# Patient Record
Sex: FEMALE | Race: WHITE | Employment: UNEMPLOYED | URBAN - METROPOLITAN AREA
[De-identification: names, ages, dates, MRNs, and addresses within clinical notes are randomized per-mention and may not be internally consistent; named-entity substitution may affect disease eponyms.]

---

## 2018-10-05 ENCOUNTER — HOSPITAL ENCOUNTER (EMERGENCY)
Age: 63
Discharge: HOME | End: 2018-10-06
Attending: EMERGENCY MEDICINE
Payer: SELF-PAY

## 2018-10-05 ENCOUNTER — APPOINTMENT (OUTPATIENT)
Dept: GENERAL RADIOLOGY | Age: 63
End: 2018-10-05
Attending: NURSE PRACTITIONER
Payer: SELF-PAY

## 2018-10-05 DIAGNOSIS — L97.909 DIABETIC ULCER OF LOWER LEG (HCC): ICD-10-CM

## 2018-10-05 DIAGNOSIS — E11.622 DIABETIC ULCER OF LOWER LEG (HCC): ICD-10-CM

## 2018-10-05 DIAGNOSIS — L03.115 CELLULITIS OF RIGHT LOWER EXTREMITY: Primary | ICD-10-CM

## 2018-10-05 DIAGNOSIS — R03.0 ELEVATED BLOOD PRESSURE READING: ICD-10-CM

## 2018-10-05 LAB
ALBUMIN SERPL-MCNC: 4 G/DL (ref 3.4–5)
ALBUMIN/GLOB SERPL: 0.8 {RATIO} (ref 0.8–1.7)
ALP SERPL-CCNC: 115 U/L (ref 45–117)
ALT SERPL-CCNC: 19 U/L (ref 13–56)
ANION GAP SERPL CALC-SCNC: 10 MMOL/L (ref 3–18)
AST SERPL-CCNC: 12 U/L (ref 15–37)
BASOPHILS # BLD: 0 K/UL (ref 0–0.1)
BASOPHILS NFR BLD: 0 % (ref 0–2)
BILIRUB SERPL-MCNC: 0.4 MG/DL (ref 0.2–1)
BUN SERPL-MCNC: 18 MG/DL (ref 7–18)
BUN/CREAT SERPL: 15 (ref 12–20)
CALCIUM SERPL-MCNC: 9.7 MG/DL (ref 8.5–10.1)
CHLORIDE SERPL-SCNC: 99 MMOL/L (ref 100–108)
CK MB CFR SERPL CALC: NORMAL % (ref 0–4)
CK MB SERPL-MCNC: <1 NG/ML (ref 5–25)
CK SERPL-CCNC: 68 U/L (ref 26–192)
CO2 SERPL-SCNC: 30 MMOL/L (ref 21–32)
CREAT SERPL-MCNC: 1.22 MG/DL (ref 0.6–1.3)
DIFFERENTIAL METHOD BLD: ABNORMAL
EOSINOPHIL # BLD: 0.2 K/UL (ref 0–0.4)
EOSINOPHIL NFR BLD: 2 % (ref 0–5)
ERYTHROCYTE [DISTWIDTH] IN BLOOD BY AUTOMATED COUNT: 13 % (ref 11.6–14.5)
GLOBULIN SER CALC-MCNC: 4.9 G/DL (ref 2–4)
GLUCOSE SERPL-MCNC: 126 MG/DL (ref 74–99)
HCT VFR BLD AUTO: 40.8 % (ref 35–45)
HGB BLD-MCNC: 13.2 G/DL (ref 12–16)
LYMPHOCYTES # BLD: 1.4 K/UL (ref 0.9–3.6)
LYMPHOCYTES NFR BLD: 12 % (ref 21–52)
MCH RBC QN AUTO: 29.1 PG (ref 24–34)
MCHC RBC AUTO-ENTMCNC: 32.4 G/DL (ref 31–37)
MCV RBC AUTO: 90.1 FL (ref 74–97)
MONOCYTES # BLD: 0.7 K/UL (ref 0.05–1.2)
MONOCYTES NFR BLD: 6 % (ref 3–10)
NEUTS SEG # BLD: 9.8 K/UL (ref 1.8–8)
NEUTS SEG NFR BLD: 80 % (ref 40–73)
PLATELET # BLD AUTO: 357 K/UL (ref 135–420)
PMV BLD AUTO: 9.6 FL (ref 9.2–11.8)
POTASSIUM SERPL-SCNC: 2.9 MMOL/L (ref 3.5–5.5)
PROT SERPL-MCNC: 8.9 G/DL (ref 6.4–8.2)
RBC # BLD AUTO: 4.53 M/UL (ref 4.2–5.3)
SODIUM SERPL-SCNC: 139 MMOL/L (ref 136–145)
TROPONIN I SERPL-MCNC: <0.02 NG/ML (ref 0–0.04)
WBC # BLD AUTO: 12.2 K/UL (ref 4.6–13.2)

## 2018-10-05 PROCEDURE — 99285 EMERGENCY DEPT VISIT HI MDM: CPT

## 2018-10-05 PROCEDURE — 74011250637 HC RX REV CODE- 250/637: Performed by: EMERGENCY MEDICINE

## 2018-10-05 PROCEDURE — 71045 X-RAY EXAM CHEST 1 VIEW: CPT

## 2018-10-05 PROCEDURE — 80053 COMPREHEN METABOLIC PANEL: CPT | Performed by: NURSE PRACTITIONER

## 2018-10-05 PROCEDURE — 82550 ASSAY OF CK (CPK): CPT | Performed by: NURSE PRACTITIONER

## 2018-10-05 PROCEDURE — 85025 COMPLETE CBC W/AUTO DIFF WBC: CPT | Performed by: NURSE PRACTITIONER

## 2018-10-05 PROCEDURE — 93005 ELECTROCARDIOGRAM TRACING: CPT

## 2018-10-05 RX ORDER — BACITRACIN 500 UNIT/G
PACKET (EA) TOPICAL
Status: DISCONTINUED
Start: 2018-10-05 | End: 2018-10-06 | Stop reason: HOSPADM

## 2018-10-05 RX ORDER — OXYCODONE AND ACETAMINOPHEN 5; 325 MG/1; MG/1
1 TABLET ORAL
Qty: 10 TAB | Refills: 0 | Status: SHIPPED | OUTPATIENT
Start: 2018-10-05 | End: 2018-12-11 | Stop reason: ALTCHOICE

## 2018-10-05 RX ORDER — AMLODIPINE BESYLATE 5 MG/1
10 TABLET ORAL
Status: COMPLETED | OUTPATIENT
Start: 2018-10-05 | End: 2018-10-05

## 2018-10-05 RX ORDER — DOXYCYCLINE 100 MG/1
100 CAPSULE ORAL 2 TIMES DAILY
Qty: 20 CAP | Refills: 0 | Status: SHIPPED | OUTPATIENT
Start: 2018-10-05 | End: 2018-10-22 | Stop reason: ALTCHOICE

## 2018-10-05 RX ORDER — HYDROCHLOROTHIAZIDE 25 MG/1
50 TABLET ORAL DAILY
Qty: 20 TAB | Refills: 0 | Status: SHIPPED | OUTPATIENT
Start: 2018-10-05 | End: 2018-10-15

## 2018-10-05 RX ORDER — POTASSIUM CHLORIDE 20 MEQ/1
40 TABLET, EXTENDED RELEASE ORAL
Status: COMPLETED | OUTPATIENT
Start: 2018-10-05 | End: 2018-10-05

## 2018-10-05 RX ORDER — ONDANSETRON 4 MG/1
4 TABLET, ORALLY DISINTEGRATING ORAL
Qty: 15 TAB | Refills: 0 | Status: SHIPPED | OUTPATIENT
Start: 2018-10-05 | End: 2019-02-22

## 2018-10-05 RX ORDER — BACITRACIN 500 [USP'U]/G
OINTMENT TOPICAL 3 TIMES DAILY
Qty: 1 TUBE | Refills: 0 | Status: SHIPPED | OUTPATIENT
Start: 2018-10-05 | End: 2018-10-15

## 2018-10-05 RX ORDER — OXYCODONE AND ACETAMINOPHEN 5; 325 MG/1; MG/1
1 TABLET ORAL
Status: COMPLETED | OUTPATIENT
Start: 2018-10-05 | End: 2018-10-05

## 2018-10-05 RX ORDER — HYDROCHLOROTHIAZIDE 25 MG/1
50 TABLET ORAL
Status: COMPLETED | OUTPATIENT
Start: 2018-10-05 | End: 2018-10-05

## 2018-10-05 RX ORDER — ONDANSETRON 4 MG/1
4 TABLET, ORALLY DISINTEGRATING ORAL
Status: COMPLETED | OUTPATIENT
Start: 2018-10-05 | End: 2018-10-05

## 2018-10-05 RX ORDER — AMLODIPINE BESYLATE 10 MG/1
10 TABLET ORAL DAILY
Qty: 10 TAB | Refills: 0 | Status: SHIPPED | OUTPATIENT
Start: 2018-10-05 | End: 2018-10-11 | Stop reason: ALTCHOICE

## 2018-10-05 RX ADMIN — AMLODIPINE BESYLATE 10 MG: 5 TABLET ORAL at 21:01

## 2018-10-05 RX ADMIN — OXYCODONE AND ACETAMINOPHEN 1 TABLET: 5; 325 TABLET ORAL at 22:57

## 2018-10-05 RX ADMIN — ONDANSETRON 4 MG: 4 TABLET, ORALLY DISINTEGRATING ORAL at 22:57

## 2018-10-05 RX ADMIN — POTASSIUM CHLORIDE 40 MEQ: 20 TABLET, EXTENDED RELEASE ORAL at 21:02

## 2018-10-05 RX ADMIN — HYDROCHLOROTHIAZIDE 50 MG: 25 TABLET ORAL at 21:42

## 2018-10-05 NOTE — ED TRIAGE NOTES
Has been months without meds and doctors care. [facility] , right lower leg cellulitis , draining wound. Dressed at [facility].  Pt sent here for pain and high BP.

## 2018-10-06 VITALS
SYSTOLIC BLOOD PRESSURE: 178 MMHG | RESPIRATION RATE: 18 BRPM | OXYGEN SATURATION: 96 % | DIASTOLIC BLOOD PRESSURE: 110 MMHG | TEMPERATURE: 97.5 F | HEART RATE: 87 BPM | HEIGHT: 66 IN | WEIGHT: 260 LBS | BODY MASS INDEX: 41.78 KG/M2

## 2018-10-06 NOTE — DISCHARGE INSTRUCTIONS
Cellulitis: Care Instructions  Your Care Instructions    Cellulitis is a skin infection caused by bacteria, most often strep or staph. It often occurs after a break in the skin from a scrape, cut, bite, or puncture, or after a rash. Cellulitis may be treated without doing tests to find out what caused it. But your doctor may do tests, if needed, to look for a specific bacteria, like methicillin-resistant Staphylococcus aureus (MRSA). The doctor has checked you carefully, but problems can develop later. If you notice any problems or new symptoms, get medical treatment right away. Follow-up care is a key part of your treatment and safety. Be sure to make and go to all appointments, and call your doctor if you are having problems. It's also a good idea to know your test results and keep a list of the medicines you take. How can you care for yourself at home? · Take your antibiotics as directed. Do not stop taking them just because you feel better. You need to take the full course of antibiotics. · Prop up the infected area on pillows to reduce pain and swelling. Try to keep the area above the level of your heart as often as you can. · If your doctor told you how to care for your wound, follow your doctor's instructions. If you did not get instructions, follow this general advice:  ¨ Wash the wound with clean water 2 times a day. Don't use hydrogen peroxide or alcohol, which can slow healing. ¨ You may cover the wound with a thin layer of petroleum jelly, such as Vaseline, and a nonstick bandage. ¨ Apply more petroleum jelly and replace the bandage as needed. · Be safe with medicines. Take pain medicines exactly as directed. ¨ If the doctor gave you a prescription medicine for pain, take it as prescribed. ¨ If you are not taking a prescription pain medicine, ask your doctor if you can take an over-the-counter medicine.   To prevent cellulitis in the future  · Try to prevent cuts, scrapes, or other injuries to your skin. Cellulitis most often occurs where there is a break in the skin. · If you get a scrape, cut, mild burn, or bite, wash the wound with clean water as soon as you can to help avoid infection. Don't use hydrogen peroxide or alcohol, which can slow healing. · If you have swelling in your legs (edema), support stockings and good skin care may help prevent leg sores and cellulitis. · Take care of your feet, especially if you have diabetes or other conditions that increase the risk of infection. Wear shoes and socks. Do not go barefoot. If you have athlete's foot or other skin problems on your feet, talk to your doctor about how to treat them. When should you call for help? Call your doctor now or seek immediate medical care if:    · You have signs that your infection is getting worse, such as:  ¨ Increased pain, swelling, warmth, or redness. ¨ Red streaks leading from the area. ¨ Pus draining from the area. ¨ A fever.     · You get a rash.    Watch closely for changes in your health, and be sure to contact your doctor if:    · You do not get better as expected. Where can you learn more? Go to http://nata-fatmata.info/. Randi Augustin in the search box to learn more about \"Cellulitis: Care Instructions. \"  Current as of: April 18, 2018  Content Version: 11.8  © 2157-6171 [employer]wise, [employer]. Care instructions adapted under license by [employer] (which disclaims liability or warranty for this information). If you have questions about a medical condition or this instruction, always ask your healthcare professional. [place] [address] any warranty or liability for your use of this information.

## 2018-10-06 NOTE — ED NOTES
Purposeful rounding completed: 
 
Side rails up x 1:  YES Bed in low position and wheels locked: YES Call bell within reach: YES Comfort addressed: YES Toileting needs addressed: YES Plan of care reviewed/updated with patient and or family members: YES 
IV site assessed: YES Pain assessed and addressed: YES, 8

## 2018-10-06 NOTE — ED PROVIDER NOTES
EMERGENCY DEPARTMENT HISTORY AND PHYSICAL EXAM 
 
8:47 PM 
 
 
Date: 10/5/2018 Patient Name: Omar Pickering History of Presenting Illness Chief Complaint Patient presents with  
 Skin Infection  Hypertension History Provided By: Patient and patients  Chief Complaint: Cellulitus Duration:  Months Timing:  Worsening Location: bilateral legs and feet Quality: not obtained Severity: Moderate Modifying Factors: N/A Associated Symptoms: N/A Additional History (Context): 8:48 PM Omar Pickering is a 58 y.o. female with h/o diet controlled diabetes and HTN who presents to ED complaining of moderate bilateral cellulitus of the legs and feet onset 1.5 months ago that increased in pain severity today after stubbing toes. Patient is not managing HTN or diabetes with medication as she is no longer seeing a podiatrist despite chronic cellulitus. Complaints are not assocaited with fever, CP, or dyspnea. Patient takes 1 asprin per day. No other concerns or symptoms at this time. bp elevated, noncompliant with meds 2/2 insurance/financial difficulty. PCP: Ha Allen MD 
 
 
 
Current Facility-Administered Medications Medication Dose Route Frequency Provider Last Rate Last Dose  bacitracin 500 unit/gram packet Current Outpatient Prescriptions Medication Sig Dispense Refill  oxyCODONE-acetaminophen (PERCOCET) 5-325 mg per tablet Take 1 Tab by mouth every four (4) hours as needed for Pain. Max Daily Amount: 6 Tabs. 10 Tab 0  
 ondansetron (ZOFRAN ODT) 4 mg disintegrating tablet Take 1 Tab by mouth every eight (8) hours as needed for Nausea. 15 Tab 0  
 amLODIPine (NORVASC) 10 mg tablet Take 1 Tab by mouth daily for 10 days. 10 Tab 0  
 doxycycline (MONODOX) 100 mg capsule Take 1 Cap by mouth two (2) times a day. 20 Cap 0  
 hydroCHLOROthiazide (HYDRODIURIL) 25 mg tablet Take 2 Tabs by mouth daily for 10 days.  20 Tab 0  
  bacitracin (BACITRACIN) 500 unit/gram oint Apply  to affected area three (3) times daily for 10 days. Apply to affected area 1 Tube 0 Past History Past Medical History: 
Past Medical History:  
Diagnosis Date  Anemia  Arthritis   
 both knee  Diabetes ([address] Diet controlled  HLD (hyperlipidemia)  Hypertension  [facility]AD [facility]per[facility] artery disease) ([facility])   
 s/p L TP trunk athrectomy (05/2013)  Stroke ([facility] [address] 2009 Past Surgical History: 
Past Surgical History:  
Procedure Laterality Date  HX KNEE REPLACEMENT    
 left  HX TUMOR REMOVAL    
 soft palate Family History: 
Family History Problem Relation Age of Onset  Cancer Mother  Cancer Father  Diabetes Father  Hypertension Father Social History: 
Social History Substance Use Topics  Smoking status: Never Smoker  Smokeless tobacco: Never Used  Alcohol use No  
 
 
Allergies: Allergies Allergen Reactions  Codeine Hives  Lisinopril Swelling All statins  Sulfur Hives Review of Systems Review of Systems Constitutional: Negative for chills and fever. Respiratory: Negative for shortness of breath. Cardiovascular: Negative for chest pain. Skin:  
     cellulitus of bilateral legs and feet All other systems reviewed and are negative. Visit Vitals  BP (!) 178/110  Pulse 87  Temp 97.5 °F (36.4 °C)  Resp 18  Ht 5' 6\" (1.676 m)  Wt 117.9 kg (260 lb)  SpO2 96%  BMI 41.97 kg/m2 Physical Exam / Medical Decision Making I am the first provider for this patient. I reviewed the vital signs, available nursing notes, past medical history, past surgical history, family history and social history. Vital Signs-Reviewed the patient's vital signs. Physical exam: 
General:  obese, no apparent distress. Head:  Normocephalic atraumatic. Eyes:  Pupils midrange extraocular movements intact.   No pallor or conjunctival injection. Nose:  No rhinorrhea, inspection grossly normal.   
Ears:  Grossly normal to inspection, no discharge. Mouth:  Mucous membranes moist, no appreciable intraoral lesion. Neck/Back:  Trachea midline, no asymmetry. Chest:  Grossly normal inspection, symmetric chest rise. Pulmonary:  Clear to auscultation bilaterally no wheezes rhonchi or rales. Cardiovascular:  S1-S2 no murmurs rubs or gallops. Abdomen: Soft, nontender, nondistended no guarding rebound or peritoneal signs. Extremities:  Grossly normal to inspection, peripheral pulses intact  1+ pitting edema symmetric bilaterally chronic skin changes, overlying the RIGHT mid shin there is mild erythema and mild warmth and a large patch of shallow skin ulcer with fat layer exposed. Neurologic:  Alert and oriented no appreciable focal neurologic deficit Skin:  Warm and dry Psychiatric:  Grossly normal mood and affect Nursing note reviewed, vital signs reviewed. ED course: 
Patient presents with cellulitis secondary to a diabetic ulcer, noncompliance with antihypertensive medication, she is afebrile and not tachycardic hypertensive here saturation normal on room air, last was sent prior to my evaluation White blood cell count not elevated, chemistry unremarkable, She had a ACE inhibitor cough reported, did not want to take an ACE inhibitor, will start Norvasc, antibiotic and if blood pressure controlled she is clinically stable for a trial of antibiotic as an outpatient. She'll require podiatry follow-up EKG done at 1813 hrs. normal sinus rhythm heart rate 99 intervals within normal limits no ST changes no ectopy. Blood pressure persistently elevated, she was given a pressure medication pain medication her blood pressure rechecked in the 170s, labs are largely unremarkable she was referred to PCP started on antibiotics, blood pressure medication, pain medication with strict return precautions Patient's presentation, history, physical exam and laboratory evaluations were reviewed. At this time patient was felt to be stable for outpatient management and follow with primary care/specialist.  Patient was instructed to return to the emergency department with any concerns. Disposition: 
 
Discharged home Portions of this chart were created with Dragon medical speech to text program.   Unrecognized errors may be present. Diagnostic Study Results Labs - Recent Results (from the past 12 hour(s)) EKG, 12 LEAD, INITIAL Collection Time: 10/05/18  6:13 PM  
Result Value Ref Range Ventricular Rate 99 BPM  
 Atrial Rate 99 BPM  
 P-R Interval 152 ms QRS Duration 106 ms  
 Q-T Interval 376 ms QTC Calculation (Bezet) 482 ms Calculated P Axis 3 degrees Calculated R Axis 41 degrees Calculated T Axis 37 degrees Diagnosis Normal sinus rhythm Nonspecific ST abnormality Abnormal ECG When compared with ECG of 26-JAN-2010 11:26, 
Vent. rate has increased BY  43 BPM 
ST now depressed in Lateral leads CBC WITH AUTOMATED DIFF Collection Time: 10/05/18  6:20 PM  
Result Value Ref Range WBC 12.2 4.6 - 13.2 K/uL  
 RBC 4.53 4.20 - 5.30 M/uL  
 HGB 13.2 12.0 - 16.0 g/dL HCT 40.8 35.0 - 45.0 % MCV 90.1 74.0 - 97.0 FL  
 MCH 29.1 24.0 - 34.0 PG  
 MCHC 32.4 31.0 - 37.0 g/dL  
 RDW 13.0 11.6 - 14.5 % PLATELET 254 401 - 095 K/uL MPV 9.6 9.2 - 11.8 FL  
 NEUTROPHILS 80 (H) 40 - 73 % LYMPHOCYTES 12 (L) 21 - 52 % MONOCYTES 6 3 - 10 % EOSINOPHILS 2 0 - 5 % BASOPHILS 0 0 - 2 %  
 ABS. NEUTROPHILS 9.8 (H) 1.8 - 8.0 K/UL  
 ABS. LYMPHOCYTES 1.4 0.9 - 3.6 K/UL  
 ABS. MONOCYTES 0.7 0.05 - 1.2 K/UL  
 ABS. EOSINOPHILS 0.2 0.0 - 0.4 K/UL  
 ABS. BASOPHILS 0.0 0.0 - 0.1 K/UL  
 DF AUTOMATED METABOLIC PANEL, COMPREHENSIVE Collection Time: 10/05/18  6:20 PM  
Result Value Ref Range Sodium 139 136 - 145 mmol/L  Potassium 2.9 (LL) 3.5 - 5.5 mmol/L  
 Chloride 99 (L) 100 - 108 mmol/L  
 CO2 30 21 - 32 mmol/L Anion gap 10 3.0 - 18 mmol/L Glucose 126 (H) 74 - 99 mg/dL BUN 18 7.0 - 18 MG/DL Creatinine 1.22 0.6 - 1.3 MG/DL  
 BUN/Creatinine ratio 15 12 - 20 GFR est AA 54 (L) >60 ml/min/1.73m2 GFR est non-AA 45 (L) >60 ml/min/1.73m2 Calcium 9.7 8.5 - 10.1 MG/DL Bilirubin, total 0.4 0.2 - 1.0 MG/DL  
 ALT (SGPT) 19 13 - 56 U/L  
 AST (SGOT) 12 (L) 15 - 37 U/L Alk. phosphatase 115 45 - 117 U/L Protein, total 8.9 (H) 6.4 - 8.2 g/dL Albumin 4.0 3.4 - 5.0 g/dL Globulin 4.9 (H) 2.0 - 4.0 g/dL A-G Ratio 0.8 0.8 - 1.7 CARDIAC PANEL,(CK, CKMB & TROPONIN) Collection Time: 10/05/18  6:20 PM  
Result Value Ref Range CK 68 26 - 192 U/L  
 CK - MB <1.0 <3.6 ng/ml CK-MB Index  0.0 - 4.0 % CALCULATION NOT PERFORMED WHEN RESULT IS BELOW LINEAR LIMIT Troponin-I, Qt. <0.02 0.0 - 0.045 NG/ML Radiologic Studies -  
XR CHEST PORT    (Results Pending) Diagnosis Clinical Impression: 1. Cellulitis of right lower extremity 2. Diabetic ulcer of lower leg ([address] 3. Elevated blood pressure reading Follow-up Information Follow up With Details Comments Contact Info Lashae Hinds MD Call in 1 day  [address] [facility] [address] [place] [id number] [phone] [facility] EMERGENCY DEPT  As needed, If symptoms worsen [address] 
[phone] Discharge Medication List as of 10/5/2018 10:37 PM  
  
START taking these medications Details  
oxyCODONE-acetaminophen (PERCOCET) 5-325 mg per tablet Take 1 Tab by mouth every four (4) hours as needed for Pain. Max Daily Amount: 6 Tabs., Print, Disp-10 Tab, R-0  
  
ondansetron (ZOFRAN ODT) 4 mg disintegrating tablet Take 1 Tab by mouth every eight (8) hours as needed for Nausea., Normal, Disp-15 Tab, R-0  
  
amLODIPine (NORVASC) 10 mg tablet Take 1 Tab by mouth daily for 10 days. , Normal, Disp-10 Tab, R-0  
  
 doxycycline (MONODOX) 100 mg capsule Take 1 Cap by mouth two (2) times a day., Normal, Disp-20 Cap, R-0  
  
hydroCHLOROthiazide (HYDRODIURIL) 25 mg tablet Take 2 Tabs by mouth daily for 10 days. , Normal, Disp-20 Tab, R-0  
  
  
 
_______________________________ Attestations: Scribe Attestation [employer] acting as a scribe for and in the presence of Angel Hairston MD     
October 05, 2018 at 8:58 PM 
    
Provider Attestation:     
I personally performed the services described in the documentation, reviewed the documentation, as recorded by the scribe in my presence, and it accurately and completely records my words and actions. October 05, 2018 at 8:58 PM - Angel Hairston MD   
 
_______________________________

## 2018-10-07 LAB
ATRIAL RATE: 99 BPM
CALCULATED P AXIS, ECG09: 3 DEGREES
CALCULATED R AXIS, ECG10: 41 DEGREES
CALCULATED T AXIS, ECG11: 37 DEGREES
DIAGNOSIS, 93000: NORMAL
P-R INTERVAL, ECG05: 152 MS
Q-T INTERVAL, ECG07: 376 MS
QRS DURATION, ECG06: 106 MS
QTC CALCULATION (BEZET), ECG08: 482 MS
VENTRICULAR RATE, ECG03: 99 BPM

## 2018-10-11 ENCOUNTER — OFFICE VISIT (OUTPATIENT)
Dept: FAMILY MEDICINE CLINIC | Age: 63
End: 2018-10-11

## 2018-10-11 VITALS
SYSTOLIC BLOOD PRESSURE: 145 MMHG | WEIGHT: 265.8 LBS | HEART RATE: 87 BPM | RESPIRATION RATE: 18 BRPM | TEMPERATURE: 96.8 F | HEIGHT: 66 IN | DIASTOLIC BLOOD PRESSURE: 68 MMHG | BODY MASS INDEX: 42.72 KG/M2 | OXYGEN SATURATION: 95 %

## 2018-10-11 DIAGNOSIS — I10 ESSENTIAL HYPERTENSION: ICD-10-CM

## 2018-10-11 DIAGNOSIS — Z86.73 H/O: STROKE: ICD-10-CM

## 2018-10-11 DIAGNOSIS — M79.89 LEG SWELLING: Primary | ICD-10-CM

## 2018-10-11 DIAGNOSIS — I73.9 PAD (PERIPHERAL ARTERY DISEASE) (HCC): ICD-10-CM

## 2018-10-11 DIAGNOSIS — M79.604 PAIN OF RIGHT LOWER EXTREMITY: ICD-10-CM

## 2018-10-11 DIAGNOSIS — L97.912 ULCER OF LOWER EXTREMITY WITH FAT LAYER EXPOSED, RIGHT (HCC): ICD-10-CM

## 2018-10-11 PROBLEM — E66.01 OBESITY, MORBID (HCC): Status: ACTIVE | Noted: 2018-10-11

## 2018-10-11 PROBLEM — E11.21 TYPE 2 DIABETES WITH NEPHROPATHY (HCC): Status: ACTIVE | Noted: 2018-10-11

## 2018-10-11 RX ORDER — IBUPROFEN 800 MG/1
800 TABLET ORAL
Qty: 30 TAB | Refills: 1 | Status: SHIPPED | OUTPATIENT
Start: 2018-10-11 | End: 2018-12-20 | Stop reason: SDUPTHER

## 2018-10-11 RX ORDER — CIPROFLOXACIN 500 MG/1
500 TABLET ORAL 2 TIMES DAILY
Qty: 20 TAB | Refills: 0 | Status: SHIPPED | OUTPATIENT
Start: 2018-10-11 | End: 2018-10-21

## 2018-10-11 RX ORDER — FUROSEMIDE 40 MG/1
40 TABLET ORAL DAILY
Qty: 30 TAB | Refills: 3 | Status: SHIPPED | OUTPATIENT
Start: 2018-10-11 | End: 2018-11-05 | Stop reason: SDUPTHER

## 2018-10-11 RX ORDER — CEFTRIAXONE 1 G/1
1 INJECTION, POWDER, FOR SOLUTION INTRAMUSCULAR; INTRAVENOUS ONCE
Qty: 1 VIAL | Refills: 0
Start: 2018-10-11 | End: 2018-10-11

## 2018-10-11 RX ORDER — ASPIRIN 81 MG/1
81 TABLET ORAL DAILY
COMMUNITY

## 2018-10-11 RX ORDER — LOSARTAN POTASSIUM 50 MG/1
50 TABLET ORAL DAILY
Qty: 30 TAB | Refills: 3 | Status: SHIPPED | OUTPATIENT
Start: 2018-10-11 | End: 2019-02-22

## 2018-10-11 RX ORDER — METRONIDAZOLE 500 MG/1
500 TABLET ORAL 3 TIMES DAILY
Qty: 30 TAB | Refills: 0 | Status: SHIPPED | OUTPATIENT
Start: 2018-10-11 | End: 2018-11-05

## 2018-10-11 NOTE — PATIENT INSTRUCTIONS
- take 1/2 tablet of amlodipine in the morning 
- take the other 1/2 tablet of amlodipine in the evening

## 2018-10-11 NOTE — PROGRESS NOTES
PCP discussed with NN instructions for patient to take 1/2 tablet of amlodipine 10mg in the AM and the other half of amlodipine in the evening. Verbal order readback.

## 2018-10-11 NOTE — MR AVS SNAPSHOT
[address] [place] [id number] 
[phone] Patient: Ever Bhakta MRN: [id number] :1955 Visit Information Date & Time Provider Department Dept. Phone Encounter #  
 10/11/2018 12:45 PM [id number], [address] [facility] [phone] Follow-up Instructions Return in about 1 week (around 10/18/2018). Upcoming Health Maintenance Date Due  
 FOOT EXAM Q1 1965 MICROALBUMIN Q1 1965 EYE EXAM RETINAL OR DILATED Q1 1965 DTaP/Tdap/Td series (1 - Tdap) 1976 PAP AKA CERVICAL CYTOLOGY 1976 Shingrix Vaccine Age 50> (1 of 2) 2005 FOBT Q 1 YEAR AGE 50-75 2005 HEMOGLOBIN A1C Q6M 10/2/2012 BREAST CANCER SCRN MAMMOGRAM 2014 LIPID PANEL Q1 2014 Influenza Age 5 to Adult 2018 Allergies as of 10/11/2018  Review Complete On: 10/11/2018 By: Rolly Demarco LPN Severity Noted Reaction Type Reactions Codeine  2013    Hives Lisinopril  10/05/2018    Swelling All statins Sulfur  2013    Hives Current Immunizations  Reviewed on 2014 Name Date Influenza Vaccine 2014  8:30 AM  
 Pneumococcal Vaccine (Unspecified Type) 10/1/2012 Not reviewed this visit You Were Diagnosed With   
  
 Codes Comments Leg swelling    -  Primary ICD-10-CM: M79.89 ICD-9-CM: 729.81 PAD (peripheral artery disease) (HCC)     ICD-10-CM: I73.9 ICD-9-CM: 443.9 Essential hypertension     ICD-10-CM: I10 
ICD-9-CM: 401.9 H/O: stroke     ICD-10-CM: Z86.73 
ICD-9-CM: V12.54 Ulcer of lower extremity with fat layer exposed, right ([address]     ICD-10-CM: U06.614 ICD-9-CM: 707.10 Pain of right lower extremity     ICD-10-CM: M79.604 ICD-9-CM: 729.5 Vitals BP Pulse Temp Resp Height(growth percentile) Weight(growth percentile) 145/68 (BP 1 Location: Left arm, BP Patient Position: Sitting) 87 96.8 °F (36 °C) (Oral) 18 5' 6\" (1.676 m) 265 lb 12.8 oz (120.6 kg) SpO2 BMI OB Status Smoking Status 95% 42.9 kg/m2 Postmenopausal Never Smoker Vitals History BMI and BSA Data Body Mass Index Body Surface Area 42.9 kg/m 2 2.37 m 2 Preferred Pharmacy Pharmacy Name Phone [facility] [phone] Your Updated Medication List  
  
   
This list is accurate as of 10/11/18  1:49 PM.  Always use your most recent med list.  
  
  
  
  
 aspirin delayed-release 81 mg tablet Take  by mouth daily. bacitracin 500 unit/gram Oint Commonly known as:  BACITRACIN Apply  to affected area three (3) times daily for 10 days. Apply to affected area  
  
 cefTRIAXone 1 gram injection Commonly known as:  ROCEPHIN  
1 g by IntraMUSCular route once for 1 dose. ciprofloxacin HCl 500 mg tablet Commonly known as:  CIPRO Take 1 Tab by mouth two (2) times a day for 10 days. doxycycline 100 mg capsule Commonly known as:  Alvera Spells Take 1 Cap by mouth two (2) times a day. furosemide 40 mg tablet Commonly known as:  LASIX Take 1 Tab by mouth daily. hydroCHLOROthiazide 25 mg tablet Commonly known as:  HYDRODIURIL Take 2 Tabs by mouth daily for 10 days. ibuprofen 800 mg tablet Commonly known as:  MOTRIN Take 1 Tab by mouth every eight (8) hours as needed for Pain. metroNIDAZOLE 500 mg tablet Commonly known as:  FLAGYL Take 1 Tab by mouth three (3) times daily. ondansetron 4 mg disintegrating tablet Commonly known as:  ZOFRAN ODT Take 1 Tab by mouth every eight (8) hours as needed for Nausea. oxyCODONE-acetaminophen 5-325 mg per tablet Commonly known as:  PERCOCET Take 1 Tab by mouth every four (4) hours as needed for Pain. Max Daily Amount: 6 Tabs. Prescriptions Sent to Pharmacy Refills  
 furosemide (LASIX) 40 mg tablet 3 Sig: Take 1 Tab by mouth daily. Class: Normal  
 Pharmacy: [address] Ph #: [phone] Route: Oral  
 ciprofloxacin HCl (CIPRO) 500 mg tablet 0 Sig: Take 1 Tab by mouth two (2) times a day for 10 days. Class: Normal  
 Pharmacy: [address] Ph #: [phone] Route: Oral  
 metroNIDAZOLE (FLAGYL) 500 mg tablet 0 Sig: Take 1 Tab by mouth three (3) times daily. Class: Normal  
 Pharmacy: [address] Ph #: [phone] Route: Oral  
 ibuprofen (MOTRIN) 800 mg tablet 1 Sig: Take 1 Tab by mouth every eight (8) hours as needed for Pain. Class: Normal  
 Pharmacy: [address] Ph #: [phone] Route: Oral  
  
We Performed the Following CEFTRIAXONE SODIUM INJECTION  MG [ [facility]] Comments:  
 Mix per protocol TN THER/PROPH/DIAG INJECTION, SUBCUT/IM [id number] CPT(R)] REFERRAL TO CARDIOLOGY [PQQ45 Custom] Follow-up Instructions Return in about 1 week (around 10/18/2018). To-Do List   
 10/11/2018 Lab:  C REACTIVE PROTEIN, QT   
  
 10/11/2018 Microbiology:  CULTURE, WOUND Rahel Thomas STAIN   
  
 10/11/2018 Vascular/US:  DUPLEX LOWER EXT VENOUS BILAT   
  
 10/11/2018 Lab:  METABOLIC PANEL, BASIC   
  
 10/11/2018 Lab:  MICROALBUMIN, UR, RAND W/ MICROALB/CREAT RATIO   
  
 10/11/2018 Lab:  SED RATE (ESR)   
  
 10/11/2018 Lab:  TSH 3RD GENERATION Referral Information Referral ID Referred By Referred To  
  
 [id number] [facility], [address] [facility], [place]   
   [address] [facility] Phone: [phone] Fax: [phone] Visits Status Start Date End Date 1 New Request 10/11/18 10/11/19 If your referral has a status of pending review or denied, additional information will be sent to support the outcome of this decision. Patient Instructions - take 1/2 tablet of amlodipine in the morning 
- take the other 1/2 tablet of amlodipine in the evening Introducing hospitals & HEALTH SERVICES! [facility] introduces [employer] patient portal. Now you can access parts of your medical record, email your doctor's office, and request medication refills online. 1. In your internet browser, go to [email or web address] 2. Click on the First Time User? Click Here link in the Sign In box. You will see the New Member Sign Up page. 3. Enter your [employer] Access Code exactly as it appears below. You will not need to use this code after youve completed the sign-up process. If you do not sign up before the expiration date, you must request a new code. · [employer] Access Code: [email or web address] Expires: 1/3/2019  5:29 PM 
 
4. Enter the last four digits of your Social Security Number (xxxx) and Date of Birth (mm/dd/yyyy) as indicated and click Submit. You will be taken to the next sign-up page. 5. Create a [employer] ID. This will be your [employer] login ID and cannot be changed, so think of one that is secure and easy to remember. 6. Create a [employer] password. You can change your password at any time. 7. Enter your Password Reset Question and Answer. This can be used at a later time if you forget your password. 8. Enter your e-mail address. You will receive e-mail notification when new information is available in [address] 9. Click Sign Up. You can now view and download portions of your medical record. 10. Click the Download Summary menu link to download a portable copy of your medical information.  
 
If you have questions, please visit the Frequently Asked Questions section of the [employer]. Remember, [employer] is NOT to be used for urgent needs. For medical emergencies, dial 911. Now available from your iPhone and Android! Please provide this summary of care documentation to your next provider. Your primary care clinician is listed as Anam Horan. If you have any questions after today's visit, please call [phone].

## 2018-10-11 NOTE — PROGRESS NOTES
Chief Complaint Patient presents with  
 Skin Infection  
  cellulitis RLE  
 Medication Evaluation 1. Have you been to the ER, urgent care clinic since your last visit? Hospitalized since your last visit? Yes When: 10/05/2018 Where: [facility] Reason for visit: Cellulitis 2. Have you seen or consulted any other health care providers outside of the [facility] since your last visit? Include any pap smears or colon screening. No 
 
After obtaining consent, and per orders of Dr. Daly Montez, injection of Ceftriaxone given by Amadou Duncan LPN. Patient instructed to remain in clinic for 20 minutes afterwards, and to report any adverse reaction to me immediately.

## 2018-10-12 LAB
ANION GAP SERPL CALC-SCNC: 26 MMOL/L
BUN SERPL-MCNC: 55 MG/DL (ref 6–22)
C-REACTIVE PROTEIN, QT, 006627: 7.1 MG/DL (ref 0–0.5)
CALCIUM SERPL-MCNC: 9.8 MG/DL (ref 8.4–10.5)
CHLORIDE SERPL-SCNC: 84 MMOL/L (ref 98–110)
CO2 SERPL-SCNC: 24 MMOL/L (ref 20–32)
CREAT SERPL-MCNC: 1.8 MG/DL (ref 0.8–1.4)
GFRAA, 66117: 34.1
GFRNA, 66118: 28.1
GLUCOSE SERPL-MCNC: 228 MG/DL (ref 70–99)
POTASSIUM SERPL-SCNC: 3.1 MMOL/L (ref 3.5–5.5)
RESULT: NORMAL
SED RATE (ESR): 102 MM/HR (ref 0–30)
SODIUM SERPL-SCNC: 134 MMOL/L (ref 133–145)
TSH SERPL DL<=0.005 MIU/L-ACNC: 5.75 MCU/ML (ref 0.27–4.2)

## 2018-10-12 NOTE — PROGRESS NOTES
Veronica Reyes is a 58 y.o.  female and presents with Chief Complaint Patient presents with  
 Skin Infection  
  cellulitis RLE  
 Medication Evaluation  Hypertension  Diabetes  Leg Pain  Leg Swelling Pt was seen about 4 years back. Pt does not have insurance and could not come for follow up. Pt has been having pain , swelling in rt leg for a month. It got worse and she now has open ulcer on back of her rt lower ext. Pt does have h/o PAD. Pt used to see cardiology in the past. 
 
 
Past Medical History:  
Diagnosis Date  Anemia  Arthritis   
 both knee  Diabetes ([facility] [address] Diet controlled  HLD (hyperlipidemia)  Hypertension  PAD (peripheral artery disease) ([facility])   
 s/p L TP trunk athrectomy (05/2013)  Stroke ([facility] [address] 2009 Past Surgical History:  
Procedure Laterality Date  HX KNEE REPLACEMENT    
 left  HX TUMOR REMOVAL    
 soft palate Current Outpatient Prescriptions Medication Sig  
 aspirin delayed-release 81 mg tablet Take  by mouth daily.  furosemide (LASIX) 40 mg tablet Take 1 Tab by mouth daily.  ciprofloxacin HCl (CIPRO) 500 mg tablet Take 1 Tab by mouth two (2) times a day for 10 days.  metroNIDAZOLE (FLAGYL) 500 mg tablet Take 1 Tab by mouth three (3) times daily.  ibuprofen (MOTRIN) 800 mg tablet Take 1 Tab by mouth every eight (8) hours as needed for Pain.  losartan (COZAAR) 50 mg tablet Take 1 Tab by mouth daily.  oxyCODONE-acetaminophen (PERCOCET) 5-325 mg per tablet Take 1 Tab by mouth every four (4) hours as needed for Pain. Max Daily Amount: 6 Tabs.  ondansetron (ZOFRAN ODT) 4 mg disintegrating tablet Take 1 Tab by mouth every eight (8) hours as needed for Nausea.  doxycycline (MONODOX) 100 mg capsule Take 1 Cap by mouth two (2) times a day.  hydroCHLOROthiazide (HYDRODIURIL) 25 mg tablet Take 2 Tabs by mouth daily for 10 days.  bacitracin (BACITRACIN) 500 unit/gram oint Apply  to affected area three (3) times daily for 10 days. Apply to affected area No current facility-administered medications for this visit. Health Maintenance Topic Date Due  
 FOOT EXAM Q1  11/24/1965  MICROALBUMIN Q1  11/24/1965  
 EYE EXAM RETINAL OR DILATED Q1  11/24/1965  DTaP/Tdap/Td series (1 - Tdap) 11/24/1976  PAP AKA CERVICAL CYTOLOGY  11/24/1976  Shingrix Vaccine Age 50> (1 of 2) 11/24/2005  FOBT Q 1 YEAR AGE 50-75  11/24/2005  HEMOGLOBIN A1C Q6M  10/02/2012  BREAST CANCER SCRN MAMMOGRAM  05/30/2014  LIPID PANEL Q1  07/18/2014  Influenza Age 5 to Adult  08/01/2018  Hepatitis C Screening  Completed  Pneumococcal 19-64 Medium Risk  Completed Immunization History Administered Date(s) Administered  Influenza Vaccine 01/23/2014  Pneumococcal Vaccine (Unspecified Type) 10/01/2012 No LMP recorded. Patient is postmenopausal. 
 
 
 
Allergies and Intolerances: Allergies Allergen Reactions  Codeine Hives  Lisinopril Swelling All statins  Sulfur Hives Family History:  
Family History Problem Relation Age of Onset  Cancer Mother  Cancer Father  Diabetes Father  Hypertension Father Social History: She  reports that she has never smoked. She has never used smokeless tobacco.  She  reports that she does not drink alcohol. Review of Systems: pos symptoms as described above General: negative for - chills, fatigue, fever, weight change Psych: negative for - anxiety, depression, irritability or mood swings ENT: negative for - headaches, hearing change, nasal congestion, oral lesions, sneezing or sore throat Heme/ Lymph: negative for - bleeding problems, bruising, pallor or swollen lymph nodes Endo: negative for - hot flashes, polydipsia/polyuria or temperature intolerance Resp: negative for - cough, shortness of breath or wheezing CV: negative for - chest pain, edema or palpitations GI: negative for - abdominal pain, change in bowel habits, constipation, diarrhea or nausea/vomiting : negative for - dysuria, hematuria, incontinence, pelvic pain or vulvar/vaginal symptoms MSK: negative for - joint pain, joint swelling or muscle pain Neuro: negative for - confusion, headaches, seizures or weakness Derm: negative for - dry skin, hair changes, rash or skin lesion changes Physical:  
Vitals:  
Vitals:  
 10/11/18 1255 BP: 145/68 Pulse: 87 Resp: 18 Temp: 96.8 °F (36 °C) TempSrc: Oral  
SpO2: 95% Weight: 265 lb 12.8 oz (120.6 kg) Height: 5' 6\" (1.676 m) Exam:  
HEENT- atraumatic,normocephalic, awake, oriented, well nourished Neck - supple,no enlarged lymph nodes, no JVD, no thyromegaly Chest- CTA, no rhonchi, no crackles Heart- rrr, no murmurs / gallop/rub Abdomen- soft,BS+,NT, no hepatosplenomegaly Ext - swelling ib bothe xt , rt more than left , open ulcer, discharge on posterior aspect of rt lower leg. Neuro- no focal deficits. Power 5/5 all extremities Skin - warm,dry, no obvious rashes. Review of Data:  
LABS:  
Lab Results Component Value Date/Time WBC 12.2 10/05/2018 06:20 PM  
 HGB 13.2 10/05/2018 06:20 PM  
 HCT 40.8 10/05/2018 06:20 PM  
 PLATELET 646 09/53/3579 06:20 PM  
 
Lab Results Component Value Date/Time Sodium 139 10/05/2018 06:20 PM  
 Potassium 2.9 (LL) 10/05/2018 06:20 PM  
 Chloride 99 (L) 10/05/2018 06:20 PM  
 CO2 30 10/05/2018 06:20 PM  
 Glucose 126 (H) 10/05/2018 06:20 PM  
 BUN 18 10/05/2018 06:20 PM  
 Creatinine 1.22 10/05/2018 06:20 PM  
 
Lab Results Component Value Date/Time Cholesterol, total 177 07/18/2013 08:40 AM  
 HDL Cholesterol 71 (H) 07/18/2013 08:40 AM  
 LDL, calculated 94 07/18/2013 08:40 AM  
 Triglyceride 62 07/18/2013 08:40 AM  
 
No results found for: GPT Impression / Plan: ICD-10-CM ICD-9-CM 1. Leg swelling M79.89 729.81 furosemide (LASIX) 40 mg tablet MICROALBUMIN, UR, RAND W/ MICROALB/CREAT RATIO  
   DUPLEX LOWER EXT VENOUS BILAT  
   TSH 3RD GENERATION  
   MICROALBUMIN, UR, RAND W/ MICROALB/CREAT RATIO  
   TSH 3RD GENERATION 2. PAD (peripheral artery disease) (HCC) I73.9 443.9 REFERRAL TO CARDIOLOGY 3. Essential hypertension I03 285.9 METABOLIC PANEL, BASIC  
   losartan (COZAAR) 50 mg tablet METABOLIC PANEL, BASIC  
4. H/O: stroke Z86.73 V12.54   
5. Ulcer of lower extremity with fat layer exposed, right ([address] L97.912 707.10 CULTURE, WOUND W GRAM STAIN  
   cefTRIAXone (ROCEPHIN) 1 gram injection CEFTRIAXONE SODIUM INJECTION  MG  
   MS THER/PROPH/DIAG INJECTION, SUBCUT/IM  
   ciprofloxacin HCl (CIPRO) 500 mg tablet  
   metroNIDAZOLE (FLAGYL) 500 mg tablet SED RATE (ESR) C REACTIVE PROTEIN, QT  
   SED RATE (ESR) C REACTIVE PROTEIN, QT  
   CULTURE, WOUND W GRAM STAIN 6. Pain of right lower extremity M79.604 729.5 ibuprofen (MOTRIN) 800 mg tablet Will need to control leg swelling ,infection and may need intervention for PAD. Explained to patient risk benefits of the medications. Advised patient to stop meds if having any side effects. Pt verbalized understanding of the instructions. I have discussed the diagnosis with the patient and the intended plan as seen in the above orders. The patient has received an after-visit summary and questions were answered concerning future plans. I have discussed medication side effects and warnings with the patient as well. I have reviewed the plan of care with the patient, accepted their input and they are in agreement with the treatment goals. Reviewed plan of care. Patient has provided input and agrees with goals. Follow-up Disposition: 
Return in about 1 week (around 10/18/2018).  
 
Alistair Foster MD

## 2018-10-13 LAB
CREATININE, URINE: 70 MG/DL
MICROALB/CREAT RATIO, 140286: 26.9 MCG/MG OF CREATININE (ref 0–30)
MICROALBUMIN,URINE RANDOM 140054: 18.8 MCG/ML (ref 0.1–17)

## 2018-10-15 LAB — RESULT: ABNORMAL

## 2018-10-22 ENCOUNTER — OFFICE VISIT (OUTPATIENT)
Dept: FAMILY MEDICINE CLINIC | Age: 63
End: 2018-10-22

## 2018-10-22 ENCOUNTER — HOSPITAL ENCOUNTER (OUTPATIENT)
Dept: VASCULAR SURGERY | Age: 63
Discharge: HOME | End: 2018-10-22
Attending: INTERNAL MEDICINE
Payer: SELF-PAY

## 2018-10-22 VITALS
WEIGHT: 264 LBS | TEMPERATURE: 96.4 F | BODY MASS INDEX: 42.43 KG/M2 | HEART RATE: 96 BPM | OXYGEN SATURATION: 93 % | SYSTOLIC BLOOD PRESSURE: 143 MMHG | DIASTOLIC BLOOD PRESSURE: 62 MMHG | RESPIRATION RATE: 18 BRPM | HEIGHT: 66 IN

## 2018-10-22 DIAGNOSIS — I10 ESSENTIAL HYPERTENSION: Primary | ICD-10-CM

## 2018-10-22 DIAGNOSIS — I73.9 PAD (PERIPHERAL ARTERY DISEASE) (HCC): ICD-10-CM

## 2018-10-22 DIAGNOSIS — E03.4 HYPOTHYROIDISM DUE TO ACQUIRED ATROPHY OF THYROID: ICD-10-CM

## 2018-10-22 DIAGNOSIS — M79.89 LEG SWELLING: ICD-10-CM

## 2018-10-22 DIAGNOSIS — T14.8XXA WOUND INFECTION: ICD-10-CM

## 2018-10-22 DIAGNOSIS — L08.9 WOUND INFECTION: ICD-10-CM

## 2018-10-22 DIAGNOSIS — E87.6 HYPOKALEMIA: ICD-10-CM

## 2018-10-22 PROCEDURE — 93970 EXTREMITY STUDY: CPT

## 2018-10-22 RX ORDER — AMLODIPINE BESYLATE 10 MG/1
TABLET ORAL DAILY
COMMUNITY
End: 2018-10-22 | Stop reason: DRUGHIGH

## 2018-10-22 RX ORDER — CEFTRIAXONE 1 G/1
1 INJECTION, POWDER, FOR SOLUTION INTRAMUSCULAR; INTRAVENOUS ONCE
Qty: 1 VIAL | Refills: 0
Start: 2018-10-22 | End: 2018-10-22

## 2018-10-22 RX ORDER — AMLODIPINE BESYLATE 5 MG/1
5 TABLET ORAL DAILY
Qty: 30 TAB | Refills: 3 | Status: SHIPPED | OUTPATIENT
Start: 2018-10-22 | End: 2018-12-11 | Stop reason: ALTCHOICE

## 2018-10-22 RX ORDER — POTASSIUM CHLORIDE 20 MEQ/1
20 TABLET, EXTENDED RELEASE ORAL DAILY
Qty: 30 TAB | Refills: 3 | Status: SHIPPED | OUTPATIENT
Start: 2018-10-22 | End: 2019-02-22

## 2018-10-22 RX ORDER — HYDROCHLOROTHIAZIDE 25 MG/1
25 TABLET ORAL DAILY
COMMUNITY
End: 2018-12-11

## 2018-10-22 RX ORDER — AMITRIPTYLINE HYDROCHLORIDE 10 MG/1
10 TABLET, FILM COATED ORAL
Qty: 30 TAB | Refills: 3 | Status: SHIPPED | OUTPATIENT
Start: 2018-10-22 | End: 2018-11-05

## 2018-10-22 RX ORDER — LEVOTHYROXINE SODIUM 25 UG/1
25 TABLET ORAL
Qty: 30 TAB | Refills: 3 | Status: SHIPPED | OUTPATIENT
Start: 2018-10-22

## 2018-10-22 NOTE — PATIENT INSTRUCTIONS
Take amlodipine 5 mg daily instead of 10 mg daily  Eat bananas  Avoid Motrin, Ibuprofen.   Keep leg elevated

## 2018-10-22 NOTE — PROGRESS NOTES
Mai Alexis is a 58 y.o.  female and presents with     Chief Complaint   Patient presents with    Hypertension    Wound Infection    Leg Swelling    Hypothyroidism       Pt says she feels slightly better since last visit. However she has symptoms of pins and needles in her rt leg. She is taking lasix for leg swelling  She has h/o PAD        Past Medical History:   Diagnosis Date    Anemia     Arthritis     both knee    Diabetes ([facility] [address]     Diet controlled    HLD (hyperlipidemia)     Hypertension     PAD (peripheral artery disease) ([facility])     s/p L TP trunk athrectomy (05/2013)    Stroke ([facility] [address]     2009     Past Surgical History:   Procedure Laterality Date    HX KNEE REPLACEMENT      left    HX TUMOR REMOVAL      soft palate     Current Outpatient Medications   Medication Sig    hydroCHLOROthiazide (HYDRODIURIL) 25 mg tablet Take 25 mg by mouth daily.  potassium chloride (K-DUR, KLOR-CON) 20 mEq tablet Take 1 Tab by mouth daily.  amLODIPine (NORVASC) 5 mg tablet Take 1 Tab by mouth daily.  levothyroxine (SYNTHROID) 25 mcg tablet Take 1 Tab by mouth Daily (before breakfast).  cefTRIAXone (ROCEPHIN) 1 gram injection 1 g by IntraMUSCular route once for 1 dose.  aspirin delayed-release 81 mg tablet Take  by mouth daily.  furosemide (LASIX) 40 mg tablet Take 1 Tab by mouth daily.  metroNIDAZOLE (FLAGYL) 500 mg tablet Take 1 Tab by mouth three (3) times daily.  ibuprofen (MOTRIN) 800 mg tablet Take 1 Tab by mouth every eight (8) hours as needed for Pain.  losartan (COZAAR) 50 mg tablet Take 1 Tab by mouth daily.  oxyCODONE-acetaminophen (PERCOCET) 5-325 mg per tablet Take 1 Tab by mouth every four (4) hours as needed for Pain. Max Daily Amount: 6 Tabs.  ondansetron (ZOFRAN ODT) 4 mg disintegrating tablet Take 1 Tab by mouth every eight (8) hours as needed for Nausea. No current facility-administered medications for this visit.       Health Maintenance   Topic Date Due    FOOT EXAM Q1  11/24/1965    EYE EXAM RETINAL OR DILATED Q1  11/24/1965    DTaP/Tdap/Td series (1 - Tdap) 11/24/1976    PAP AKA CERVICAL CYTOLOGY  11/24/1976    Shingrix Vaccine Age 50> (1 of 2) 11/24/2005    FOBT Q 1 YEAR AGE 50-75  11/24/2005    HEMOGLOBIN A1C Q6M  10/02/2012    BREAST CANCER SCRN MAMMOGRAM  05/30/2014    LIPID PANEL Q1  07/18/2014    Influenza Age 5 to Adult  08/01/2018    MICROALBUMIN Q1  10/12/2019    Hepatitis C Screening  Completed    Pneumococcal 19-64 Medium Risk  Completed     Immunization History   Administered Date(s) Administered    Influenza Vaccine 01/23/2014    Pneumococcal Vaccine (Unspecified Type) 10/01/2012     No LMP recorded. Patient is postmenopausal.        Allergies and Intolerances: Allergies   Allergen Reactions    Codeine Hives    Lisinopril Swelling     All statins    Sulfur Hives       Family History:   Family History   Problem Relation Age of Onset    Cancer Mother     Cancer Father     Diabetes Father     Hypertension Father        Social History:   She  reports that  has never smoked. she has never used smokeless tobacco.  She  reports that she does not drink alcohol.             Review of Systems: pos for rt leg swelling , ulcer  General: negative for - chills, fatigue, fever, weight change  Psych: negative for - anxiety, depression, irritability or mood swings  ENT: negative for - headaches, hearing change, nasal congestion, oral lesions, sneezing or sore throat  Heme/ Lymph: negative for - bleeding problems, bruising, pallor or swollen lymph nodes  Endo: negative for - hot flashes, polydipsia/polyuria or temperature intolerance  Resp: negative for - cough, shortness of breath or wheezing  CV: negative for - chest pain, edema or palpitations  GI: negative for - abdominal pain, change in bowel habits, constipation, diarrhea or nausea/vomiting  : negative for - dysuria, hematuria, incontinence, pelvic pain or vulvar/vaginal symptoms  MSK: negative for - joint pain, joint swelling or muscle pain  Neuro: negative for - confusion, headaches, seizures or weakness  Derm: negative for - dry skin, hair changes, rash or skin lesion changes          Physical:   Vitals:   Vitals:    10/22/18 1252   BP: 143/62   Pulse: 96   Resp: 18   Temp: 96.4 °F (35.8 °C)   TempSrc: Oral   SpO2: 93%   Weight: 264 lb (119.7 kg)   Height: 5' 6\" (1.676 m)           Exam:   HEENT- atraumatic,normocephalic, awake, oriented, well nourished  Neck - supple,no enlarged lymph nodes, no JVD, no thyromegaly  Chest- CTA, no rhonchi, no crackles  Heart- rrr, no murmurs / gallop/rub  Abdomen- soft,BS+,NT, no hepatosplenomegaly  Ext - no c/c/edema , rt leg ulcer on the posterior aspect of lower leg. Non healing ulcer    Neuro- no focal deficits. Power 5/5 all extremities  Skin - warm,dry, no obvious rashes. Review of Data:   LABS:   Lab Results   Component Value Date/Time    WBC 12.2 10/05/2018 06:20 PM    HGB 13.2 10/05/2018 06:20 PM    HCT 40.8 10/05/2018 06:20 PM    PLATELET 873 82/01/3961 06:20 PM     Lab Results   Component Value Date/Time    Sodium 134 10/11/2018 02:00 PM    Potassium 3.1 (L) 10/11/2018 02:00 PM    Chloride 84 (L) 10/11/2018 02:00 PM    CO2 24 10/11/2018 02:00 PM    Glucose 228 (H) 10/11/2018 02:00 PM    BUN 55 (H) 10/11/2018 02:00 PM    Creatinine 1.8 (H) 10/11/2018 02:00 PM     Lab Results   Component Value Date/Time    Cholesterol, total 177 07/18/2013 08:40 AM    HDL Cholesterol 71 (H) 07/18/2013 08:40 AM    LDL, calculated 94 07/18/2013 08:40 AM    Triglyceride 62 07/18/2013 08:40 AM     No results found for: GPT        Impression / Plan:        ICD-10-CM ICD-9-CM    1. Essential hypertension I10 401.9 amLODIPine (NORVASC) 5 mg tablet   2. PAD (peripheral artery disease) (HCC) I73.9 443.9 LOWER EXT ART PVR MULT LEVEL SEG PRESSURES      REFERRAL TO VASCULAR SURGERY   3. Hypokalemia E87.6 276.8    4.  Hypothyroidism due to acquired atrophy of thyroid E03.4 244.8 levothyroxine (SYNTHROID) 25 mcg tablet     246.8    5. Wound infection T14. 8XXA 958. 3 cefTRIAXone (ROCEPHIN) 1 gram injection    L08.9  CEFTRIAXONE SODIUM INJECTION  MG      OK THER/PROPH/DIAG INJECTION, SUBCUT/IM      REFERRAL TO VASCULAR SURGERY           Take amlodipine 5 mg daily instead of 10 mg daily  Eat bananas  Avoid Motrin, Ibuprofen. Explained to patient risk benefits of the medications. Advised patient to stop meds if having any side effects. Pt verbalized understanding of the instructions. I have discussed the diagnosis with the patient and the intended plan as seen in the above orders. The patient has received an after-visit summary and questions were answered concerning future plans. I have discussed medication side effects and warnings with the patient as well. I have reviewed the plan of care with the patient, accepted their input and they are in agreement with the treatment goals. Reviewed plan of care. Patient has provided input and agrees with goals.     Follow-up Disposition: Not on Rosaline Sánchez MD

## 2018-10-29 ENCOUNTER — TELEPHONE (OUTPATIENT)
Dept: FAMILY MEDICINE CLINIC | Age: 63
End: 2018-10-29

## 2018-10-29 NOTE — TELEPHONE ENCOUNTER
Pt. Stated she has been hallucinating while taking medication amitriptyline (ELAVIL) 10 mg tablet. She stated that she stopped taking them on Friday and would like to know is there another medication Dr. Juan Olmos can prescribe her. Awaiting call back. Please advise.

## 2018-10-30 DIAGNOSIS — M79.606 PAIN OF LOWER EXTREMITY, UNSPECIFIED LATERALITY: Primary | ICD-10-CM

## 2018-10-30 RX ORDER — GABAPENTIN 100 MG/1
100 CAPSULE ORAL 2 TIMES DAILY
Qty: 60 CAP | Refills: 1 | Status: SHIPPED | OUTPATIENT
Start: 2018-10-30 | End: 2019-01-06 | Stop reason: SDUPTHER

## 2018-11-05 ENCOUNTER — OFFICE VISIT (OUTPATIENT)
Dept: FAMILY MEDICINE CLINIC | Age: 63
End: 2018-11-05

## 2018-11-05 VITALS
RESPIRATION RATE: 16 BRPM | HEART RATE: 97 BPM | DIASTOLIC BLOOD PRESSURE: 87 MMHG | BODY MASS INDEX: 42.91 KG/M2 | TEMPERATURE: 97.8 F | WEIGHT: 267 LBS | SYSTOLIC BLOOD PRESSURE: 120 MMHG | OXYGEN SATURATION: 96 % | HEIGHT: 66 IN

## 2018-11-05 DIAGNOSIS — Z23 ENCOUNTER FOR IMMUNIZATION: ICD-10-CM

## 2018-11-05 DIAGNOSIS — M79.89 LEG SWELLING: ICD-10-CM

## 2018-11-05 DIAGNOSIS — L03.115 CELLULITIS OF RIGHT LOWER EXTREMITY: Primary | ICD-10-CM

## 2018-11-05 RX ORDER — AMOXICILLIN AND CLAVULANATE POTASSIUM 875; 125 MG/1; MG/1
1 TABLET, FILM COATED ORAL 2 TIMES DAILY
Qty: 20 TAB | Refills: 0 | Status: SHIPPED | OUTPATIENT
Start: 2018-11-05 | End: 2018-11-11

## 2018-11-05 RX ORDER — FUROSEMIDE 40 MG/1
40 TABLET ORAL 2 TIMES DAILY WITH MEALS
Qty: 60 TAB | Refills: 3 | Status: SHIPPED | OUTPATIENT
Start: 2018-11-05

## 2018-11-05 NOTE — PROGRESS NOTES
Thony Demarco is a 58 y.o.  female and presents with     Chief Complaint   Patient presents with    Hypertension    Immunization/Injection    Leg Swelling       Pt continues to have rt lwoer swelling. Neurontin seems to be helping with pain  Pt has [employer] insurance. However it is supplenental and pt does not want to go to ER due to worry og getting bills. Pt  has appt with cardiology and also for arterial dopplers. However she does not have appt with vascular. Pt is vary of going to ER. She is worried about bills. Rt leg still has the wound and it is still draining. Past Medical History:   Diagnosis Date    Anemia     Arthritis     both knee    Diabetes ([facility] [address]     Diet controlled    HLD (hyperlipidemia)     Hypertension     [facility]AD [facility])     s/p L TP trunk athrectomy (05/2013)    Stroke ([facility] [address]     2009     Past Surgical History:   Procedure Laterality Date    HX KNEE REPLACEMENT      left    HX TUMOR REMOVAL      soft palate     Current Outpatient Medications   Medication Sig    furosemide (LASIX) 40 mg tablet Take 1 Tab by mouth two (2) times daily (with meals).  amoxicillin-clavulanate (AUGMENTIN) 875-125 mg per tablet Take 1 Tab by mouth two (2) times a day for 10 days.  gabapentin (NEURONTIN) 100 mg capsule Take 1 Cap by mouth two (2) times a day.  potassium chloride (K-DUR, KLOR-CON) 20 mEq tablet Take 1 Tab by mouth daily.  amLODIPine (NORVASC) 5 mg tablet Take 1 Tab by mouth daily.  levothyroxine (SYNTHROID) 25 mcg tablet Take 1 Tab by mouth Daily (before breakfast).  aspirin delayed-release 81 mg tablet Take  by mouth daily.  losartan (COZAAR) 50 mg tablet Take 1 Tab by mouth daily.  hydroCHLOROthiazide (HYDRODIURIL) 25 mg tablet Take 25 mg by mouth daily.  ibuprofen (MOTRIN) 800 mg tablet Take 1 Tab by mouth every eight (8) hours as needed for Pain.     oxyCODONE-acetaminophen (PERCOCET) 5-325 mg per tablet Take 1 Tab by mouth every four (4) hours as needed for Pain. Max Daily Amount: 6 Tabs.  ondansetron (ZOFRAN ODT) 4 mg disintegrating tablet Take 1 Tab by mouth every eight (8) hours as needed for Nausea. No current facility-administered medications for this visit. Health Maintenance   Topic Date Due    FOOT EXAM Q1  11/24/1965    EYE EXAM RETINAL OR DILATED Q1  11/24/1965    DTaP/Tdap/Td series (1 - Tdap) 11/24/1976    PAP AKA CERVICAL CYTOLOGY  11/24/1976    Shingrix Vaccine Age 50> (1 of 2) 11/24/2005    FOBT Q 1 YEAR AGE 50-75  11/24/2005    HEMOGLOBIN A1C Q6M  10/02/2012    BREAST CANCER SCRN MAMMOGRAM  05/30/2014    LIPID PANEL Q1  07/18/2014    Influenza Age 5 to Adult  08/01/2018    MICROALBUMIN Q1  10/12/2019    Hepatitis C Screening  Completed    Pneumococcal 19-64 Medium Risk  Completed     Immunization History   Administered Date(s) Administered    Influenza Vaccine 01/23/2014    Pneumococcal Vaccine (Unspecified Type) 10/01/2012     No LMP recorded. Patient is postmenopausal.        Allergies and Intolerances: Allergies   Allergen Reactions    Codeine Hives    Lisinopril Swelling     All statins    Sulfur Hives       Family History:   Family History   Problem Relation Age of Onset    Cancer Mother     Cancer Father     Diabetes Father     Hypertension Father        Social History:   She  reports that  has never smoked. she has never used smokeless tobacco.  She  reports that she does not drink alcohol.             Review of Systems: pos for rt lower ext swelling,  wound  General: negative for - chills, fatigue, fever, weight change  Psych: negative for - anxiety, depression, irritability or mood swings  ENT: negative for - headaches, hearing change, nasal congestion, oral lesions, sneezing or sore throat  Heme/ Lymph: negative for - bleeding problems, bruising, pallor or swollen lymph nodes  Endo: negative for - hot flashes, polydipsia/polyuria or temperature intolerance  Resp: negative for - cough, shortness of breath or wheezing  CV: negative for - chest pain, edema or palpitations  GI: negative for - abdominal pain, change in bowel habits, constipation, diarrhea or nausea/vomiting  : negative for - dysuria, hematuria, incontinence, pelvic pain or vulvar/vaginal symptoms  MSK: negative for - joint pain, joint swelling or muscle pain  Neuro: negative for - confusion, headaches, seizures or weakness  Derm: negative for - dry skin, hair changes, rash or skin lesion changes          Physical:   Vitals:   Vitals:    11/05/18 1307   BP: 120/87   Pulse: 97   Resp: 16   Temp: 97.8 °F (36.6 °C)   TempSrc: Oral   SpO2: 96%   Weight: 267 lb (121.1 kg)   Height: 5' 6\" (1.676 m)           Exam:   HEENT- atraumatic,normocephalic, awake, oriented, well nourished  Neck - supple,no enlarged lymph nodes, no JVD, no thyromegaly  Chest- CTA, no rhonchi, no crackles  Heart- rrr, no murmurs / gallop/rub  Abdomen- soft,BS+,NT, no hepatosplenomegaly  Ext - rt lower ext swolllen, ulcer behand rt leg, drainage seen  Neuro- no focal deficits. Power 5/5 all extremities  Skin - warm,dry, no obvious rashes. Review of Data:   LABS:   Lab Results   Component Value Date/Time    WBC 12.2 10/05/2018 06:20 PM    HGB 13.2 10/05/2018 06:20 PM    HCT 40.8 10/05/2018 06:20 PM    PLATELET 113 07/58/7455 06:20 PM     Lab Results   Component Value Date/Time    Sodium 134 10/11/2018 02:00 PM    Potassium 3.1 (L) 10/11/2018 02:00 PM    Chloride 84 (L) 10/11/2018 02:00 PM    CO2 24 10/11/2018 02:00 PM    Glucose 228 (H) 10/11/2018 02:00 PM    BUN 55 (H) 10/11/2018 02:00 PM    Creatinine 1.8 (H) 10/11/2018 02:00 PM     Lab Results   Component Value Date/Time    Cholesterol, total 177 07/18/2013 08:40 AM    HDL Cholesterol 71 (H) 07/18/2013 08:40 AM    LDL, calculated 94 07/18/2013 08:40 AM    Triglyceride 62 07/18/2013 08:40 AM     No results found for: GPT        Impression / Plan:        ICD-10-CM ICD-9-CM    1.  Cellulitis of right lower extremity L03.115 682.6 amoxicillin-clavulanate (AUGMENTIN) 875-125 mg per tablet   2. Leg swelling G97.00 027.22 METABOLIC PANEL, COMPREHENSIVE      furosemide (LASIX) 40 mg tablet           Will need to go to ER if symptoms do not improve. Increase lasix to 2 tabs daily        Explained to patient risk benefits of the medications. Advised patient to stop meds if having any side effects. Pt verbalized understanding of the instructions. I have discussed the diagnosis with the patient and the intended plan as seen in the above orders. The patient has received an after-visit summary and questions were answered concerning future plans. I have discussed medication side effects and warnings with the patient as well. I have reviewed the plan of care with the patient, accepted their input and they are in agreement with the treatment goals. Reviewed plan of care. Patient has provided input and agrees with goals. Follow-up Disposition:  Return in about 2 weeks (around 11/19/2018).     Lawyer Lanre MD

## 2018-11-05 NOTE — PROGRESS NOTES
1. Have you been to the ER, urgent care clinic since your last visit? Hospitalized since your last visit? No    2. Have you seen or consulted any other health care providers outside of the [address] [place] [address] since your last visit? Include any pap smears or colon screening.  No    Chief Complaint   Patient presents with    Hypertension    Immunization/Injection

## 2018-11-06 ENCOUNTER — TELEPHONE (OUTPATIENT)
Dept: FAMILY MEDICINE CLINIC | Age: 63
End: 2018-11-06

## 2018-11-06 NOTE — TELEPHONE ENCOUNTER
Patient requesting a call back regarding referral to vascular surgery.  States provider referred to does not accept her insurance

## 2018-11-08 ENCOUNTER — HOSPITAL ENCOUNTER (INPATIENT)
Age: 63
LOS: 3 days | Discharge: HOME | DRG: 872 | End: 2018-11-11
Attending: EMERGENCY MEDICINE | Admitting: HOSPITALIST
Payer: COMMERCIAL

## 2018-11-08 ENCOUNTER — APPOINTMENT (OUTPATIENT)
Dept: GENERAL RADIOLOGY | Age: 63
DRG: 872 | End: 2018-11-08
Attending: EMERGENCY MEDICINE
Payer: COMMERCIAL

## 2018-11-08 DIAGNOSIS — L03.116 CELLULITIS OF LEFT LOWER EXTREMITY: ICD-10-CM

## 2018-11-08 DIAGNOSIS — Z78.9 FAILURE OF OUTPATIENT TREATMENT: ICD-10-CM

## 2018-11-08 DIAGNOSIS — L97.912 ULCER OF LOWER EXTREMITY WITH FAT LAYER EXPOSED, RIGHT (HCC): Primary | ICD-10-CM

## 2018-11-08 PROBLEM — L03.119 CELLULITIS OF LEG: Status: ACTIVE | Noted: 2018-11-08

## 2018-11-08 PROBLEM — A41.9 SEPSIS (HCC): Status: ACTIVE | Noted: 2018-11-08

## 2018-11-08 LAB
ANION GAP SERPL CALC-SCNC: 12 MMOL/L (ref 3–18)
APPEARANCE UR: CLEAR
BACTERIA URNS QL MICRO: NEGATIVE /HPF
BASOPHILS # BLD: 0.1 K/UL (ref 0–0.1)
BASOPHILS NFR BLD: 0 % (ref 0–2)
BILIRUB UR QL: NEGATIVE
BUN SERPL-MCNC: 15 MG/DL (ref 7–18)
BUN/CREAT SERPL: 11 (ref 12–20)
CALCIUM SERPL-MCNC: 8.2 MG/DL (ref 8.5–10.1)
CHLORIDE SERPL-SCNC: 99 MMOL/L (ref 100–108)
CO2 SERPL-SCNC: 22 MMOL/L (ref 21–32)
COLOR UR: YELLOW
CREAT SERPL-MCNC: 1.32 MG/DL (ref 0.6–1.3)
DIFFERENTIAL METHOD BLD: ABNORMAL
EOSINOPHIL # BLD: 0.1 K/UL (ref 0–0.4)
EOSINOPHIL NFR BLD: 1 % (ref 0–5)
EPITH CASTS URNS QL MICRO: NORMAL /LPF (ref 0–5)
ERYTHROCYTE [DISTWIDTH] IN BLOOD BY AUTOMATED COUNT: 13.9 % (ref 11.6–14.5)
EST. AVERAGE GLUCOSE BLD GHB EST-MCNC: 237 MG/DL
GLUCOSE BLD STRIP.AUTO-MCNC: 219 MG/DL (ref 70–110)
GLUCOSE SERPL-MCNC: 209 MG/DL (ref 74–99)
GLUCOSE UR STRIP.AUTO-MCNC: NEGATIVE MG/DL
HBA1C MFR BLD: 9.9 % (ref 4.2–5.6)
HCT VFR BLD AUTO: 33.7 % (ref 35–45)
HGB BLD-MCNC: 11.2 G/DL (ref 12–16)
HGB UR QL STRIP: ABNORMAL
KETONES UR QL STRIP.AUTO: ABNORMAL MG/DL
LACTATE BLD-SCNC: 1.32 MMOL/L (ref 0.4–2)
LEUKOCYTE ESTERASE UR QL STRIP.AUTO: NEGATIVE
LYMPHOCYTES # BLD: 1.4 K/UL (ref 0.9–3.6)
LYMPHOCYTES NFR BLD: 9 % (ref 21–52)
MCH RBC QN AUTO: 28.6 PG (ref 24–34)
MCHC RBC AUTO-ENTMCNC: 33.2 G/DL (ref 31–37)
MCV RBC AUTO: 86.2 FL (ref 74–97)
MONOCYTES # BLD: 1 K/UL (ref 0.05–1.2)
MONOCYTES NFR BLD: 6 % (ref 3–10)
NEUTS SEG # BLD: 13.2 K/UL (ref 1.8–8)
NEUTS SEG NFR BLD: 84 % (ref 40–73)
NITRITE UR QL STRIP.AUTO: NEGATIVE
PH UR STRIP: 5 [PH] (ref 5–8)
PLATELET # BLD AUTO: 371 K/UL (ref 135–420)
PMV BLD AUTO: 9.5 FL (ref 9.2–11.8)
POTASSIUM SERPL-SCNC: 3.6 MMOL/L (ref 3.5–5.5)
PROT UR STRIP-MCNC: ABNORMAL MG/DL
RBC # BLD AUTO: 3.91 M/UL (ref 4.2–5.3)
RBC #/AREA URNS HPF: NORMAL /HPF (ref 0–5)
SODIUM SERPL-SCNC: 133 MMOL/L (ref 136–145)
SP GR UR REFRACTOMETRY: 1.02 (ref 1–1.03)
UROBILINOGEN UR QL STRIP.AUTO: 0.2 EU/DL (ref 0.2–1)
WBC # BLD AUTO: 15.8 K/UL (ref 4.6–13.2)
WBC URNS QL MICRO: NORMAL /HPF (ref 0–4)

## 2018-11-08 PROCEDURE — 74011250636 HC RX REV CODE- 250/636: Performed by: HOSPITALIST

## 2018-11-08 PROCEDURE — 83605 ASSAY OF LACTIC ACID: CPT

## 2018-11-08 PROCEDURE — 74011636637 HC RX REV CODE- 636/637: Performed by: HOSPITALIST

## 2018-11-08 PROCEDURE — 74011250637 HC RX REV CODE- 250/637: Performed by: EMERGENCY MEDICINE

## 2018-11-08 PROCEDURE — 71045 X-RAY EXAM CHEST 1 VIEW: CPT

## 2018-11-08 PROCEDURE — 96367 TX/PROPH/DG ADDL SEQ IV INF: CPT

## 2018-11-08 PROCEDURE — 93005 ELECTROCARDIOGRAM TRACING: CPT

## 2018-11-08 PROCEDURE — 74011250637 HC RX REV CODE- 250/637: Performed by: HOSPITALIST

## 2018-11-08 PROCEDURE — 85025 COMPLETE CBC W/AUTO DIFF WBC: CPT

## 2018-11-08 PROCEDURE — 99285 EMERGENCY DEPT VISIT HI MDM: CPT

## 2018-11-08 PROCEDURE — 81001 URINALYSIS AUTO W/SCOPE: CPT

## 2018-11-08 PROCEDURE — 80048 BASIC METABOLIC PNL TOTAL CA: CPT

## 2018-11-08 PROCEDURE — 82962 GLUCOSE BLOOD TEST: CPT

## 2018-11-08 PROCEDURE — 74011250636 HC RX REV CODE- 250/636: Performed by: EMERGENCY MEDICINE

## 2018-11-08 PROCEDURE — 65270000029 HC RM PRIVATE

## 2018-11-08 PROCEDURE — 74011000258 HC RX REV CODE- 258: Performed by: EMERGENCY MEDICINE

## 2018-11-08 PROCEDURE — 96374 THER/PROPH/DIAG INJ IV PUSH: CPT

## 2018-11-08 PROCEDURE — 83036 HEMOGLOBIN GLYCOSYLATED A1C: CPT

## 2018-11-08 PROCEDURE — 74011000258 HC RX REV CODE- 258: Performed by: HOSPITALIST

## 2018-11-08 PROCEDURE — 87040 BLOOD CULTURE FOR BACTERIA: CPT

## 2018-11-08 RX ORDER — ASPIRIN 81 MG/1
81 TABLET ORAL DAILY
Status: DISCONTINUED | OUTPATIENT
Start: 2018-11-09 | End: 2018-11-11 | Stop reason: HOSPADM

## 2018-11-08 RX ORDER — LEVOTHYROXINE SODIUM 25 UG/1
25 TABLET ORAL
Status: DISCONTINUED | OUTPATIENT
Start: 2018-11-09 | End: 2018-11-11 | Stop reason: HOSPADM

## 2018-11-08 RX ORDER — OXYCODONE AND ACETAMINOPHEN 5; 325 MG/1; MG/1
1 TABLET ORAL
Status: DISCONTINUED | OUTPATIENT
Start: 2018-11-08 | End: 2018-11-11 | Stop reason: HOSPADM

## 2018-11-08 RX ORDER — DEXTROSE MONOHYDRATE 25 G/50ML
25-50 INJECTION, SOLUTION INTRAVENOUS AS NEEDED
Status: DISCONTINUED | OUTPATIENT
Start: 2018-11-08 | End: 2018-11-11 | Stop reason: HOSPADM

## 2018-11-08 RX ORDER — VANCOMYCIN 2 GRAM/500 ML IN 0.9 % SODIUM CHLORIDE INTRAVENOUS
2000 ONCE
Status: COMPLETED | OUTPATIENT
Start: 2018-11-08 | End: 2018-11-08

## 2018-11-08 RX ORDER — ENOXAPARIN SODIUM 100 MG/ML
40 INJECTION SUBCUTANEOUS EVERY 24 HOURS
Status: DISCONTINUED | OUTPATIENT
Start: 2018-11-08 | End: 2018-11-11 | Stop reason: HOSPADM

## 2018-11-08 RX ORDER — INSULIN LISPRO 100 [IU]/ML
INJECTION, SOLUTION INTRAVENOUS; SUBCUTANEOUS
Status: DISCONTINUED | OUTPATIENT
Start: 2018-11-08 | End: 2018-11-11 | Stop reason: HOSPADM

## 2018-11-08 RX ORDER — LOSARTAN POTASSIUM 50 MG/1
50 TABLET ORAL DAILY
Status: DISCONTINUED | OUTPATIENT
Start: 2018-11-09 | End: 2018-11-11 | Stop reason: HOSPADM

## 2018-11-08 RX ORDER — HYDROCHLOROTHIAZIDE 25 MG/1
25 TABLET ORAL DAILY
Status: DISCONTINUED | OUTPATIENT
Start: 2018-11-09 | End: 2018-11-11 | Stop reason: HOSPADM

## 2018-11-08 RX ORDER — GABAPENTIN 100 MG/1
100 CAPSULE ORAL 2 TIMES DAILY
Status: DISCONTINUED | OUTPATIENT
Start: 2018-11-08 | End: 2018-11-11 | Stop reason: HOSPADM

## 2018-11-08 RX ORDER — SODIUM CHLORIDE 0.9 % (FLUSH) 0.9 %
5-10 SYRINGE (ML) INJECTION AS NEEDED
Status: DISCONTINUED | OUTPATIENT
Start: 2018-11-08 | End: 2018-11-11 | Stop reason: HOSPADM

## 2018-11-08 RX ORDER — MAGNESIUM SULFATE 100 %
4 CRYSTALS MISCELLANEOUS AS NEEDED
Status: DISCONTINUED | OUTPATIENT
Start: 2018-11-08 | End: 2018-11-11 | Stop reason: HOSPADM

## 2018-11-08 RX ORDER — SODIUM CHLORIDE 9 MG/ML
50 INJECTION, SOLUTION INTRAVENOUS CONTINUOUS
Status: DISCONTINUED | OUTPATIENT
Start: 2018-11-08 | End: 2018-11-11 | Stop reason: HOSPADM

## 2018-11-08 RX ORDER — AMLODIPINE BESYLATE 5 MG/1
5 TABLET ORAL DAILY
Status: DISCONTINUED | OUTPATIENT
Start: 2018-11-09 | End: 2018-11-11 | Stop reason: HOSPADM

## 2018-11-08 RX ORDER — ONDANSETRON 4 MG/1
4 TABLET, ORALLY DISINTEGRATING ORAL
Status: DISCONTINUED | OUTPATIENT
Start: 2018-11-08 | End: 2018-11-11 | Stop reason: HOSPADM

## 2018-11-08 RX ORDER — ACETAMINOPHEN 500 MG
1000 TABLET ORAL
Status: COMPLETED | OUTPATIENT
Start: 2018-11-08 | End: 2018-11-08

## 2018-11-08 RX ADMIN — PIPERACILLIN SODIUM,TAZOBACTAM SODIUM 4.5 G: 4; .5 INJECTION, POWDER, FOR SOLUTION INTRAVENOUS at 16:25

## 2018-11-08 RX ADMIN — SODIUM CHLORIDE 1000 ML: 900 INJECTION, SOLUTION INTRAVENOUS at 16:25

## 2018-11-08 RX ADMIN — GABAPENTIN 100 MG: 100 CAPSULE ORAL at 18:58

## 2018-11-08 RX ADMIN — ACETAMINOPHEN 1000 MG: 500 TABLET, FILM COATED ORAL at 17:37

## 2018-11-08 RX ADMIN — ENOXAPARIN SODIUM 40 MG: 100 INJECTION SUBCUTANEOUS at 18:58

## 2018-11-08 RX ADMIN — INSULIN LISPRO 4 UNITS: 100 INJECTION, SOLUTION INTRAVENOUS; SUBCUTANEOUS at 22:38

## 2018-11-08 RX ADMIN — SODIUM CHLORIDE 50 ML/HR: 900 INJECTION, SOLUTION INTRAVENOUS at 19:52

## 2018-11-08 RX ADMIN — PIPERACILLIN SODIUM,TAZOBACTAM SODIUM 3.38 G: 3; .375 INJECTION, POWDER, FOR SOLUTION INTRAVENOUS at 21:07

## 2018-11-08 RX ADMIN — VANCOMYCIN HYDROCHLORIDE 2000 MG: 10 INJECTION, POWDER, LYOPHILIZED, FOR SOLUTION INTRAVENOUS at 17:00

## 2018-11-08 RX ADMIN — OXYCODONE HYDROCHLORIDE AND ACETAMINOPHEN 1 TABLET: 5; 325 TABLET ORAL at 22:36

## 2018-11-08 RX ADMIN — OXYCODONE HYDROCHLORIDE AND ACETAMINOPHEN 1 TABLET: 5; 325 TABLET ORAL at 18:58

## 2018-11-08 NOTE — ED TRIAGE NOTES
Cellulitis of right lower leg. Has been trying to get into wound clinic since Monday, no response. Sent to ED by pcp for admission

## 2018-11-08 NOTE — H&P
Medicine History and Physical 
Patient: Alana Alba   Age:  58 y.o. Assessment Principal Problem: 
  Cellulitis of leg (11/8/2018) Active Problems: 
  Diabetes mellitus ([facility] [address] (3/28/2013) Hypertension (3/28/2013) Dyslipidemia (5/1/2013) PAD (peripheral artery disease) ([facility]) () Sepsis ([facility] [address] (11/8/2018) Plan 1)  Sepsis from Cellulitis/infected ulceration on R foot 
 - meet criteria on source, wbc count and HR 
 - Vanc, zosyn 
 - wound care - Appreciate surgery consult- no surgery for now will get TOMEKA's and likely consult vascular if abnorma. Will get wound care and possible need for martha boot if thought necessary by wound care. - she has history of stent in L Leg 
 
2)  HTN/HLD 
 - home meds 3)  DM 
 - SSI DISPO 
-Pt to be admitted  at this time for reasons addressed above, continued hospitalization for ongoing assessment and treatment indicated Anticipated Date of Discharge: 3-4 days Anticipated Disposition (home, SNF) : home Chief Complaint:  
Chief Complaint Patient presents with  
 Skin Infection HPI:  
Alana Alba is a 58y.o. year old female who presents with  Poor healing R foot ulcer. Patient has had ulcer for many months but worsened first of October. She had been using  bacitracin and then covering the wound. The pain had gotten worse so she came to ed, given doxy and discharged. She comes back meeting sepsis criteria. Surgery saw at bedside and recommended abx, wound care. Review of Systems - positive responses in bold type Constitutional: Negative for fever, chills, diaphoresis and unexpected weight change. HENT: Negative for ear pain, congestion, sore throat, rhinorrhea, drooling, trouble swallowing, neck pain and tinnitus. Eyes: Negative for photophobia, pain, redness and visual disturbance. Respiratory: negative for shortness of breath, cough, choking, chest tightness, wheezing or stridor. Cardiovascular: Negative for chest pain, palpitations and leg swelling. Gastrointestinal: Negative for nausea, vomiting, abdominal pain, diarrhea, constipation, blood in stool, abdominal distention and anal bleeding. Genitourinary: Negative for dysuria, urgency, frequency, hematuria, flank pain and difficulty urinating. Musculoskeletal: Negative for back pain and arthralgias. Skin: Negative for color change, rash and wound. Neurological: Negative for dizziness, seizures, syncope, speech difficulty, light-headedness or headaches. Hematological: Does not bruise/bleed easily. Psychiatric/Behavioral: Negative for suicidal ideas, hallucinations, behavioral problems, self-injury or agitation Past Medical History: 
Past Medical History:  
Diagnosis Date  Anemia  Arthritis   
 both knee  Diabetes ([facility] [address] Diet controlled  HLD (hyperlipidemia)  Hypertension  PAD (peripheral artery disease) (HCC)   
 s/p L TP trunk athrectomy (05/2013)  Stroke ([facility] 2009 Past Surgical History: 
Past Surgical History:  
Procedure Laterality Date  HX KNEE REPLACEMENT    
 left  HX TUMOR REMOVAL    
 soft palate Family History: 
Family History Problem Relation Age of Onset  Cancer Mother  Cancer Father  Diabetes Father  Hypertension Father Social History: 
Social History Socioeconomic History  Marital status:  Spouse name: Not on file  Number of children: Not on file  Years of education: Not on file  Highest education level: Not on file Social Needs  Financial resource strain: Not on file  Food insecurity - worry: Not on file  Food insecurity - inability: Not on file  Transportation needs - medical: Not on file  Transportation needs - non-medical: Not on file Occupational History  Not on file Tobacco Use  Smoking status: Never Smoker  Smokeless tobacco: Never Used Substance and Sexual Activity  Alcohol use: No  
 Drug use: No  
 Sexual activity: Not Currently Partners: Male Birth control/protection: None Other Topics Concern  Not on file Social History Narrative  Not on file Home Medications: 
Prior to Admission medications Medication Sig Start Date End Date Taking? Authorizing Provider  
furosemide (LASIX) 40 mg tablet Take 1 Tab by mouth two (2) times daily (with meals). 11/5/18   Marlon Nelson MD  
amoxicillin-clavulanate (AUGMENTIN) 875-125 mg per tablet Take 1 Tab by mouth two (2) times a day for 10 days. 11/5/18 11/15/18  Marlon Nelson MD  
gabapentin (NEURONTIN) 100 mg capsule Take 1 Cap by mouth two (2) times a day. 10/30/18   Marlon Nelson MD  
hydroCHLOROthiazide (HYDRODIURIL) 25 mg tablet Take 25 mg by mouth daily. Provider, Historical  
potassium chloride (K-DUR, KLOR-CON) 20 mEq tablet Take 1 Tab by mouth daily. 10/22/18   Marlon Nelson MD  
amLODIPine (NORVASC) 5 mg tablet Take 1 Tab by mouth daily. 10/22/18   Marlon Nelson MD  
levothyroxine (SYNTHROID) 25 mcg tablet Take 1 Tab by mouth Daily (before breakfast). 10/22/18   Marlon Nelson MD  
aspirin delayed-release 81 mg tablet Take  by mouth daily. Provider, Historical  
ibuprofen (MOTRIN) 800 mg tablet Take 1 Tab by mouth every eight (8) hours as needed for Pain. 10/11/18   Marlon Nelson MD  
losartan (COZAAR) 50 mg tablet Take 1 Tab by mouth daily. 10/11/18   Marlon Nelson MD  
oxyCODONE-acetaminophen (PERCOCET) 5-325 mg per tablet Take 1 Tab by mouth every four (4) hours as needed for Pain. Max Daily Amount: 6 Tabs. 10/5/18   Shamika Maldonado MD  
ondansetron (ZOFRAN ODT) 4 mg disintegrating tablet Take 1 Tab by mouth every eight (8) hours as needed for Nausea. 10/5/18   Shamika Maldonado MD  
 
 
Allergies: Allergies Allergen Reactions  Codeine Hives  Lisinopril Swelling All statins  Sulfur Hives Physical Exam: Visit Vitals /87 Pulse 94 Temp 97.7 °F (36.5 °C) Resp 16 Ht 5' 6\" (1.676 m) Wt 120.2 kg (265 lb) SpO2 99% BMI 42.77 kg/m² Physical Exam: 
General appearance: alert, cooperative, no distress, appears stated age Head: Normocephalic, without obvious abnormality, atraumatic Neck: supple, trachea midline Lungs: clear to auscultation bilaterally Heart: regular rate and rhythm, S1, S2 normal, no murmur, click, rub or gallop Abdomen: soft, non-tender. Bowel sounds normal. No masses,  no organomegaly Extremities: At least 9 cm ulceration at posterior foot / ankle of R with exudate clearly present and surrounding erythema Skin: Skin color, texture, turgor normal. No rashes or lesions Neurologic: Grossly normal 
 
Intake and Output: 
Current Shift:  No intake/output data recorded. Last three shifts:  No intake/output data recorded. Lab/Data Reviewed: 
CMP:  
Lab Results Component Value Date/Time  (L) 11/08/2018 04:13 PM  
 K 3.6 11/08/2018 04:13 PM  
 CL 99 (L) 11/08/2018 04:13 PM  
 CO2 22 11/08/2018 04:13 PM  
 AGAP 12 11/08/2018 04:13 PM  
  (H) 11/08/2018 04:13 PM  
 BUN 15 11/08/2018 04:13 PM  
 CREA 1.32 (H) 11/08/2018 04:13 PM  
 GFRAA 49 (L) 11/08/2018 04:13 PM  
 GFRNA 41 (L) 11/08/2018 04:13 PM  
 CA 8.2 (L) 11/08/2018 04:13 PM  
 
CBC:  
Lab Results Component Value Date/Time WBC 15.8 (H) 11/08/2018 04:13 PM  
 HGB 11.2 (L) 11/08/2018 04:13 PM  
 HCT 33.7 (L) 11/08/2018 04:13 PM  
  11/08/2018 04:13 PM  
 
All Cardiac Markers in the last 24 hours: No results found for: CPK, CK, CKMMB, CKMB, RCK3, CKMBT, CKNDX, CKND1, JESS, TROPT, TROIQ, DAVID, TROPT, TNIPOC, BNP, BNPP Cindra Mcburney, MD 
 
November 8, 2018

## 2018-11-08 NOTE — ED PROVIDER NOTES
Emergency Department Treatment Report Patient: Jordan Kramer Age: 58 y.o. Sex: female YOB: 1955 Admit Date: 11/8/2018 PCP: Zander Erickson MD  
MRN: [id number]  CSN: [id number] Room: 11/11 Time Dictated: 3:51 PM   
 
Chief Complaint Chief Complaint Patient presents with  
 Skin Infection History of Present Illness 58 y.o. female, PMH of CVA in 2009, NIDDM, HTN, HLD, suspected PAD, Anemia, presents with chronic 5/10 Right Lower extremity wounds that have worsened over the past 3 days. Patient confirms that she saw her PCP 3 days ago. States she was initially on Doxycycline and her PCP switched her to Augmentin. However, the patient states the surrounding cellulitis has worsened and the wounds have worsened and are consistently draining. Patient also reports the use of Extra Strength Tylenol, which she reports has provided alleviation in her symptoms. Reports she has attempted to get into a wound clinic, but has received no response. Patient states her PCP recommended her to come to the ED for admission for her cellulitis. Patient denies other associated symptoms. No other concerns were expressed at this time. Review of Systems Constitutional: No fever, chills, or weight loss Eyes: No visual symptoms. ENT: No sore throat, runny nose or ear pain. Respiratory: No cough, dyspnea or wheezing. Cardiovascular: No chest pain, pressure, palpitations, tightness or heaviness. Gastrointestinal: No vomiting, diarrhea or abdominal pain. Genitourinary: No dysuria, frequency, or urgency. Musculoskeletal: No joint pain or swelling. Integumentary: RLE wound and surrounding erythema to BLE (R>L). Neurological: No headaches, sensory or motor symptoms. Denies complaints in all other systems. Past Medical/Surgical History Past Medical History:  
Diagnosis Date  Anemia  Arthritis   
 both knee  Diabetes ([address] Diet controlled  HLD (hyperlipidemia)  Hypertension  PAD (peripheral artery disease) (HCC)   
 s/p L TP trunk athrectomy (05/2013)  Stroke ([facility] [address] 2009 Past Surgical History:  
Procedure Laterality Date  HX KNEE REPLACEMENT    
 left  HX TUMOR REMOVAL    
 soft palate Social History Social History Socioeconomic History  Marital status:  Spouse name: Not on file  Number of children: Not on file  Years of education: Not on file  Highest education level: Not on file Social Needs  Financial resource strain: Not on file  Food insecurity - worry: Not on file  Food insecurity - inability: Not on file  Transportation needs - medical: Not on file  Transportation needs - non-medical: Not on file Occupational History  Not on file Tobacco Use  Smoking status: Never Smoker  Smokeless tobacco: Never Used Substance and Sexual Activity  Alcohol use: No  
 Drug use: No  
 Sexual activity: Not Currently Partners: Male Birth control/protection: None Other Topics Concern  Not on file Social History Narrative  Not on file Family History Family History Problem Relation Age of Onset  Cancer Mother  Cancer Father  Diabetes Father  Hypertension Father Home Medications Prior to Admission Medications Prescriptions Last Dose Informant Patient Reported? Taking? amLODIPine (NORVASC) 5 mg tablet   No No  
Sig: Take 1 Tab by mouth daily. amoxicillin-clavulanate (AUGMENTIN) 875-125 mg per tablet   No No  
Sig: Take 1 Tab by mouth two (2) times a day for 10 days. aspirin delayed-release 81 mg tablet   Yes No  
Sig: Take  by mouth daily. furosemide (LASIX) 40 mg tablet   No No  
Sig: Take 1 Tab by mouth two (2) times daily (with meals). gabapentin (NEURONTIN) 100 mg capsule   No No  
Sig: Take 1 Cap by mouth two (2) times a day. hydroCHLOROthiazide (HYDRODIURIL) 25 mg tablet   Yes No  
Sig: Take 25 mg by mouth daily. ibuprofen (MOTRIN) 800 mg tablet   No No  
Sig: Take 1 Tab by mouth every eight (8) hours as needed for Pain.  
levothyroxine (SYNTHROID) 25 mcg tablet   No No  
Sig: Take 1 Tab by mouth Daily (before breakfast). losartan (COZAAR) 50 mg tablet   No No  
Sig: Take 1 Tab by mouth daily. ondansetron (ZOFRAN ODT) 4 mg disintegrating tablet   No No  
Sig: Take 1 Tab by mouth every eight (8) hours as needed for Nausea. oxyCODONE-acetaminophen (PERCOCET) 5-325 mg per tablet   No No  
Sig: Take 1 Tab by mouth every four (4) hours as needed for Pain. Max Daily Amount: 6 Tabs. potassium chloride (K-DUR, KLOR-CON) 20 mEq tablet   No No  
Sig: Take 1 Tab by mouth daily. Facility-Administered Medications: None Allergies Allergies Allergen Reactions  Codeine Hives  Lisinopril Swelling All statins  Sulfur Hives Physical Exam  
 
ED Triage Vitals [11/08/18 1541] ED Encounter Vitals Group BP (!) 184/102 Pulse (Heart Rate) (!) 103 Resp Rate 16 Temp 97.7 °F (36.5 °C) Temp src O2 Sat (%) 97 % Weight 265 lb Height 5' 6\" Constitutional: Patient appears well developed and well nourished. Appearance and behavior are age and situation appropriate. HEENT: Conjunctiva clear. PERRLA. Mucous membranes moist, non-erythematous. Surface of the pharynx, palate, and tongue are pink, moist and without lesions. Neck: supple, non tender, symmetrical, no masses or JVD. Respiratory: lungs clear to auscultation, nonlabored respirations. No tachypnea or accessory muscle use. Cardiovascular: heart regular rhythm. Tachycardia. Calves soft and non-tender. Left distal pulses 2+. 1+ right-sided DP and PT pulses. Gastrointestinal:  Abdomen soft, nontender without complaint of pain to palpation Musculoskeletal: Nail beds pink with prompt capillary refill Integumentary: warm and dry.  Large ulcer to posterior right lower leg with purulent drainage and surrounding erythema from the ankle to mid-shin and calf. Neurologic: alert and oriented, Sensation intact, motor strength equal and symmetric. No facial asymmetry or dysarthria. Diagnostic Studies Lab:  
Recent Results (from the past 12 hour(s)) CBC WITH AUTOMATED DIFF Collection Time: 11/08/18  4:13 PM  
Result Value Ref Range WBC 15.8 (H) 4.6 - 13.2 K/uL  
 RBC 3.91 (L) 4.20 - 5.30 M/uL  
 HGB 11.2 (L) 12.0 - 16.0 g/dL HCT 33.7 (L) 35.0 - 45.0 % MCV 86.2 74.0 - 97.0 FL  
 MCH 28.6 24.0 - 34.0 PG  
 MCHC 33.2 31.0 - 37.0 g/dL  
 RDW 13.9 11.6 - 14.5 % PLATELET 165 385 - 517 K/uL MPV 9.5 9.2 - 11.8 FL  
 NEUTROPHILS 84 (H) 40 - 73 % LYMPHOCYTES 9 (L) 21 - 52 % MONOCYTES 6 3 - 10 % EOSINOPHILS 1 0 - 5 % BASOPHILS 0 0 - 2 %  
 ABS. NEUTROPHILS 13.2 (H) 1.8 - 8.0 K/UL  
 ABS. LYMPHOCYTES 1.4 0.9 - 3.6 K/UL  
 ABS. MONOCYTES 1.0 0.05 - 1.2 K/UL  
 ABS. EOSINOPHILS 0.1 0.0 - 0.4 K/UL  
 ABS. BASOPHILS 0.1 0.0 - 0.1 K/UL  
 DF AUTOMATED METABOLIC PANEL, BASIC Collection Time: 11/08/18  4:13 PM  
Result Value Ref Range Sodium 133 (L) 136 - 145 mmol/L Potassium 3.6 3.5 - 5.5 mmol/L Chloride 99 (L) 100 - 108 mmol/L  
 CO2 22 21 - 32 mmol/L Anion gap 12 3.0 - 18 mmol/L Glucose 209 (H) 74 - 99 mg/dL BUN 15 7.0 - 18 MG/DL Creatinine 1.32 (H) 0.6 - 1.3 MG/DL  
 BUN/Creatinine ratio 11 (L) 12 - 20 GFR est AA 49 (L) >60 ml/min/1.73m2 GFR est non-AA 41 (L) >60 ml/min/1.73m2 Calcium 8.2 (L) 8.5 - 10.1 MG/DL POC LACTIC ACID Collection Time: 11/08/18  4:14 PM  
Result Value Ref Range Lactic Acid (POC) 1.32 0.40 - 2.00 mmol/L Imaging: No results found. [facility] 
 
ED Course/Medical Decision Making Patient with worsening cellulitis and drainage from her chronic right posterior leg wound, despite outpatient antibiotics. Her WBC is increasing from earlier this week.  She is septic with tachycardia and leukocytosis, but does not need 30 mL/kg IVF because her lactate is normal and her MAP is above 65. She requires admission for IV Abx and surgical consultation for possible debridement of her right leg wound. Consult:  Discussed care with Dr. Rocio Lynn, Hospitalist Standard discussion; including history of patients chief complaint, available diagnostic results, and treatment course. Accepts the patient for admission. 4:53 PM 
 
Consult:  Discussed care with Dr. Krishna Matias, Surgery Standard discussion; including history of patients chief complaint, available diagnostic results, and treatment course. Will come and see the patient. 4:57 PM 
 
Medications  
sodium chloride (NS) flush 5-10 mL (not administered)  
vancomycin (VANCOCIN) 2000 mg in  ml infusion (not administered)  
sodium chloride 0.9 % bolus infusion 1,000 mL (not administered)  
piperacillin-tazobactam (ZOSYN) 4.5 g in 0.9% sodium chloride (MBP/ADV) 100 mL MBP (not administered) VANCOMYCIN INFORMATION NOTE (not administered) Final Diagnosis ICD-10-CM ICD-9-CM 1. Ulcer of lower extremity with fat layer exposed, right ([facility] [address] L97.912 707.10 2. Cellulitis of left lower extremity L03.116 682.6 3. Failure of outpatient treatment Z78.9 V49.89 Disposition Patient is admitted. My Medications ASK your doctor about these medications Instructions Each Dose to Equal Morning Noon Evening Bedtime  
amLODIPine 5 mg tablet Commonly known as:  Araceli Jessieos Your last dose was: Your next dose is: Take 1 Tab by mouth daily. 5 mg 
  
  
  
  
  
amoxicillin-clavulanate 875-125 mg per tablet Commonly known as:  AUGMENTIN Your last dose was: Your next dose is: Take 1 Tab by mouth two (2) times a day for 10 days. 1 Tab 
  
  
  
  
  
aspirin delayed-release 81 mg tablet Your last dose was: Your next dose is: Take  by mouth daily. furosemide 40 mg tablet Commonly known as:  LASIX Your last dose was: Your next dose is: Take 1 Tab by mouth two (2) times daily (with meals). 40 mg 
  
  
  
  
  
gabapentin 100 mg capsule Commonly known as:  NEURONTIN Your last dose was: Your next dose is: Take 1 Cap by mouth two (2) times a day. 100 mg 
  
  
  
  
  
hydroCHLOROthiazide 25 mg tablet Commonly known as:  HYDRODIURIL Your last dose was: Your next dose is: Take 25 mg by mouth daily. 25 mg 
  
  
  
  
  
ibuprofen 800 mg tablet Commonly known as:  MOTRIN Your last dose was: Your next dose is: Take 1 Tab by mouth every eight (8) hours as needed for Pain. 800 mg 
  
  
  
  
  
levothyroxine 25 mcg tablet Commonly known as:  SYNTHROID Your last dose was: Your next dose is: Take 1 Tab by mouth Daily (before breakfast). 25 mcg 
  
  
  
  
  
losartan 50 mg tablet Commonly known as:  COZAAR Your last dose was: Your next dose is: Take 1 Tab by mouth daily. 50 mg 
  
  
  
  
  
ondansetron 4 mg disintegrating tablet Commonly known as:  ZOFRAN ODT Your last dose was: Your next dose is: Take 1 Tab by mouth every eight (8) hours as needed for Nausea. 4 mg 
  
  
  
  
  
oxyCODONE-acetaminophen 5-325 mg per tablet Commonly known as:  PERCOCET Your last dose was: Your next dose is: Take 1 Tab by mouth every four (4) hours as needed for Pain. Max Daily Amount: 6 Tabs. 1 Tab 
  
  
  
  
  
potassium chloride 20 mEq tablet Commonly known as:  K-DUR, KLOR-CON Your last dose was: Your next dose is: Take 1 Tab by mouth daily. 20 mEq Rinku Shields MD 
November 8, 2018 My signature above authenticates this document and my orders, the final   
diagnosis (es), discharge prescription (s), and instructions in the Epic   
record. If you have any questions please contact [phone].   
Nursing notes have been reviewed by the physician/ advanced practice   
Clinician. Scribe Attestation Alicia Manuelito acting as a scribe for and in the presence of Azul Euceda MD     
November 08, 2018 at 4:13 PM 
    
Provider Attestation:     
I personally performed the services described in the documentation, reviewed the documentation, as recorded by the scribe in my presence, and it accurately and completely records my words and actions.  November 08, 2018 at 4:13 PM - Azul Euceda MD

## 2018-11-08 NOTE — PROGRESS NOTES
completed the initial Spiritual Assessment of the patient in room 11 of the emergency room and offered Pastoral Care support to patient and family members Patient does not have any Adventist/cultural needs that will affect patients preferences in health care. Chaplains will continue to follow and will provide pastoral care on an as needed/requested basis. Angel Gomez Board Certified [facility] Spiritual Care Department [phone]

## 2018-11-08 NOTE — PROGRESS NOTES
Pharmacy Dosing Services: Vancomycin Indication: Diabetic foot infection Day of therapy: 1 Other Antimicrobials (Include dose, start day & day of therapy): 
Piperacillin-tazobactam 3.375 g IV every 8 hrs (extended infusion) Loading dose (date given): 2000 mg IV once (18) Current Maintenance dose: New start Goal Vancomycin Level: 15-20 
(Trough 15-20 for most infections, 20 for meningitis/osteomyelitis, pre-HD level ~25) Vancomycin Level (if drawn): New start Significant Cultures:  
18 - Blood culture - pending Renal function stable? (unstable defined as SCr increase of 0.5 mg/dL or > 50% increase from baseline, whichever is greater) (Y/N): Y  
 
CAPD, Hemodialysis or Renal Replacement Therapy (Y/N): N Recent Labs 18 
1613 CREA 1.32* BUN 15 WBC 15.8* Temp (24hrs), Av.7 °F (36.5 °C), Min:97.7 °F (36.5 °C), Max:97.7 °F (36.5 °C) Creatinine Clearance (Creatinine Clearance (ml/min)): 58.4 mL/min Regimen assessment: New start Maintenance dose: 1250 mg every 18 hours Next scheduled level: 11/10 at 1630 Pharmacy will follow daily and adjust medications as appropriate for renal function and/or serum levels. Thank you, Samantha Bass

## 2018-11-09 ENCOUNTER — APPOINTMENT (OUTPATIENT)
Dept: VASCULAR SURGERY | Age: 63
DRG: 872 | End: 2018-11-09
Attending: HOSPITALIST
Payer: COMMERCIAL

## 2018-11-09 LAB
ANION GAP SERPL CALC-SCNC: 9 MMOL/L (ref 3–18)
ATRIAL RATE: 102 BPM
BUN SERPL-MCNC: 12 MG/DL (ref 7–18)
BUN/CREAT SERPL: 11 (ref 12–20)
CALCIUM SERPL-MCNC: 8.3 MG/DL (ref 8.5–10.1)
CALCULATED P AXIS, ECG09: 27 DEGREES
CALCULATED R AXIS, ECG10: 38 DEGREES
CALCULATED T AXIS, ECG11: 44 DEGREES
CHLORIDE SERPL-SCNC: 107 MMOL/L (ref 100–108)
CO2 SERPL-SCNC: 22 MMOL/L (ref 21–32)
CREAT SERPL-MCNC: 1.09 MG/DL (ref 0.6–1.3)
DIAGNOSIS, 93000: NORMAL
ERYTHROCYTE [DISTWIDTH] IN BLOOD BY AUTOMATED COUNT: 14 % (ref 11.6–14.5)
GLUCOSE BLD STRIP.AUTO-MCNC: 142 MG/DL (ref 70–110)
GLUCOSE BLD STRIP.AUTO-MCNC: 152 MG/DL (ref 70–110)
GLUCOSE BLD STRIP.AUTO-MCNC: 161 MG/DL (ref 70–110)
GLUCOSE BLD STRIP.AUTO-MCNC: 167 MG/DL (ref 70–110)
GLUCOSE BLD STRIP.AUTO-MCNC: 174 MG/DL (ref 70–110)
GLUCOSE SERPL-MCNC: 117 MG/DL (ref 74–99)
HCT VFR BLD AUTO: 30.7 % (ref 35–45)
HGB BLD-MCNC: 9.9 G/DL (ref 12–16)
LEFT ABI: 0.52
LEFT ANTERIOR TIBIAL: 81 MMHG
LEFT ARM BP: 155 MMHG
LEFT POSTERIOR TIBIAL: 77 MMHG
LEFT TBI: 0.21
LEFT TOE PRESSURE: 33 MMHG
MCH RBC QN AUTO: 28.1 PG (ref 24–34)
MCHC RBC AUTO-ENTMCNC: 32.2 G/DL (ref 31–37)
MCV RBC AUTO: 87.2 FL (ref 74–97)
P-R INTERVAL, ECG05: 184 MS
PLATELET # BLD AUTO: 357 K/UL (ref 135–420)
PMV BLD AUTO: 10 FL (ref 9.2–11.8)
POTASSIUM SERPL-SCNC: 4.8 MMOL/L (ref 3.5–5.5)
Q-T INTERVAL, ECG07: 364 MS
QRS DURATION, ECG06: 92 MS
QTC CALCULATION (BEZET), ECG08: 474 MS
RBC # BLD AUTO: 3.52 M/UL (ref 4.2–5.3)
RIGHT ABI: 0.51
RIGHT ANTERIOR TIBIAL: 76 MMHG
RIGHT ARM BP: 154 MMHG
RIGHT ATA BP LEVEL: NORMAL
RIGHT POSTERIOR TIBIAL: 79 MMHG
RIGHT TBI: 0.15
RIGHT TOE PRESSURE: 23 MMHG
SODIUM SERPL-SCNC: 138 MMOL/L (ref 136–145)
VENTRICULAR RATE, ECG03: 102 BPM
WBC # BLD AUTO: 15.6 K/UL (ref 4.6–13.2)

## 2018-11-09 PROCEDURE — 93922 UPR/L XTREMITY ART 2 LEVELS: CPT

## 2018-11-09 PROCEDURE — 77030011256 HC DRSG MEPILEX <16IN NO BORD MOLN -A

## 2018-11-09 PROCEDURE — 74011250637 HC RX REV CODE- 250/637: Performed by: HOSPITALIST

## 2018-11-09 PROCEDURE — 85027 COMPLETE CBC AUTOMATED: CPT

## 2018-11-09 PROCEDURE — 97116 GAIT TRAINING THERAPY: CPT

## 2018-11-09 PROCEDURE — 80048 BASIC METABOLIC PNL TOTAL CA: CPT

## 2018-11-09 PROCEDURE — 97535 SELF CARE MNGMENT TRAINING: CPT

## 2018-11-09 PROCEDURE — 74011636637 HC RX REV CODE- 636/637: Performed by: HOSPITALIST

## 2018-11-09 PROCEDURE — 74011000258 HC RX REV CODE- 258: Performed by: HOSPITALIST

## 2018-11-09 PROCEDURE — 65270000029 HC RM PRIVATE

## 2018-11-09 PROCEDURE — 77030037878 HC DRSG MEPILEX >48IN BORD MOLN -B

## 2018-11-09 PROCEDURE — 82962 GLUCOSE BLOOD TEST: CPT

## 2018-11-09 PROCEDURE — 97165 OT EVAL LOW COMPLEX 30 MIN: CPT

## 2018-11-09 PROCEDURE — 74011250636 HC RX REV CODE- 250/636: Performed by: HOSPITALIST

## 2018-11-09 PROCEDURE — 97162 PT EVAL MOD COMPLEX 30 MIN: CPT

## 2018-11-09 PROCEDURE — 36415 COLL VENOUS BLD VENIPUNCTURE: CPT

## 2018-11-09 RX ORDER — INSULIN GLARGINE 100 [IU]/ML
4 INJECTION, SOLUTION SUBCUTANEOUS
Status: DISCONTINUED | OUTPATIENT
Start: 2018-11-09 | End: 2018-11-11 | Stop reason: HOSPADM

## 2018-11-09 RX ADMIN — AMLODIPINE BESYLATE 5 MG: 5 TABLET ORAL at 09:14

## 2018-11-09 RX ADMIN — HYDROCHLOROTHIAZIDE 25 MG: 25 TABLET ORAL at 09:14

## 2018-11-09 RX ADMIN — SODIUM CHLORIDE 50 ML/HR: 900 INJECTION, SOLUTION INTRAVENOUS at 19:06

## 2018-11-09 RX ADMIN — SODIUM CHLORIDE 1250 MG: 900 INJECTION, SOLUTION INTRAVENOUS at 04:37

## 2018-11-09 RX ADMIN — ASPIRIN 81 MG: 81 TABLET, COATED ORAL at 09:14

## 2018-11-09 RX ADMIN — ENOXAPARIN SODIUM 40 MG: 100 INJECTION SUBCUTANEOUS at 18:00

## 2018-11-09 RX ADMIN — LEVOTHYROXINE SODIUM 25 MCG: 25 TABLET ORAL at 08:22

## 2018-11-09 RX ADMIN — PIPERACILLIN SODIUM,TAZOBACTAM SODIUM 3.38 G: 3; .375 INJECTION, POWDER, FOR SOLUTION INTRAVENOUS at 14:44

## 2018-11-09 RX ADMIN — OXYCODONE HYDROCHLORIDE AND ACETAMINOPHEN 1 TABLET: 5; 325 TABLET ORAL at 03:03

## 2018-11-09 RX ADMIN — PIPERACILLIN SODIUM,TAZOBACTAM SODIUM 3.38 G: 3; .375 INJECTION, POWDER, FOR SOLUTION INTRAVENOUS at 22:52

## 2018-11-09 RX ADMIN — GABAPENTIN 100 MG: 100 CAPSULE ORAL at 09:14

## 2018-11-09 RX ADMIN — Medication: at 15:35

## 2018-11-09 RX ADMIN — INSULIN LISPRO 2 UNITS: 100 INJECTION, SOLUTION INTRAVENOUS; SUBCUTANEOUS at 17:57

## 2018-11-09 RX ADMIN — INSULIN LISPRO 2 UNITS: 100 INJECTION, SOLUTION INTRAVENOUS; SUBCUTANEOUS at 23:19

## 2018-11-09 RX ADMIN — LOSARTAN POTASSIUM 50 MG: 50 TABLET ORAL at 09:14

## 2018-11-09 RX ADMIN — INSULIN LISPRO 2 UNITS: 100 INJECTION, SOLUTION INTRAVENOUS; SUBCUTANEOUS at 09:13

## 2018-11-09 RX ADMIN — GABAPENTIN 100 MG: 100 CAPSULE ORAL at 17:57

## 2018-11-09 RX ADMIN — INSULIN GLARGINE 4 UNITS: 100 INJECTION, SOLUTION SUBCUTANEOUS at 23:18

## 2018-11-09 RX ADMIN — PIPERACILLIN SODIUM,TAZOBACTAM SODIUM 3.38 G: 3; .375 INJECTION, POWDER, FOR SOLUTION INTRAVENOUS at 07:00

## 2018-11-09 RX ADMIN — INSULIN LISPRO 2 UNITS: 100 INJECTION, SOLUTION INTRAVENOUS; SUBCUTANEOUS at 13:58

## 2018-11-09 RX ADMIN — SODIUM CHLORIDE 1250 MG: 900 INJECTION, SOLUTION INTRAVENOUS at 22:55

## 2018-11-09 NOTE — PROGRESS NOTES
Received patient from ED. A&Ox4, NAD. Ambulated from stretcher to bed. Wounds noted on right lower extremity, redness noted to bilateral lower extremities. Patient oriented to room and call bell for assistance, voiced understanding. 1910-Bedside and Verbal shift change report given to ERNESTO Jin  (oncoming nurse) by Nano La (offgoing nurse). Report included the following information SBAR, Kardex, Intake/Output, MAR and Recent Results.

## 2018-11-09 NOTE — PROGRESS NOTES
Patient awake. In patient's chart d/t this nurse with be Precepting and assisting ERNESTO Loya ([place]) with Patient care.

## 2018-11-09 NOTE — CONSULTS
[address]  MR#: [id number]  : 1955  ACCOUNT #: [de-identified]   DATE OF SERVICE: 2018    REASON FOR CONSULTATION:  I was asked by Maryam Miles MD in the emergency room to evaluate and give my opinion on this patient's leg wound and need for possible debridement. CHIEF COMPLAINT:  Leg wound and leg pain. HISTORY OF PRESENT ILLNESS:  The patient is a 70-year-old female, who noticed around mid-September that her ankles and legs were beginning to swell. She also noticed that there was red discoloration bilaterally. Then, her skin split and created an open wound in the right posterior leg which has been quite uncomfortable for her. She has had an open wound since 10/18. She has been treated with antibiotics 3 separate times by her primary care. She has been referred to wound care, but has not had that appointment yet. She does have a vascular surgeon, who has previously placed a stent in the left leg, and she states that she has seen him as well and was supposed to follow up today. It sounds like when he saw her in October, he sent her to the emergency room. The patient states that she is having a lot of right lower extremity pain. She notes that this is much worse when her leg is elevated than when it is left dependent. The left leg does not bother her as much when it is elevated. PAST MEDICAL HISTORY:  Significant for diet-controlled diabetes, hypertension, hyperlipidemia, peripheral vascular disease and a history of stroke in  with residual right face and right arm numbness. In addition, she has arthritis, and hypothyroidism. PAST SURGICAL HISTORY:  Significant for replacement of her left knee. She also has the stent placed in the left leg. 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     HOME MEDICATIONS:  Include Norvasc 5 mg daily, Augmentin 1 tablet twice daily, aspirin 81 mg daily, Lasix 40 mg twice daily, Neurontin 100 mg twice daily, hydrochlorothiazide 25 mg daily, ibuprofen every 8 hours 800 mg, levothyroxine 25 mcg, losartan 50 mg daily, Zofran 4 mg every 8 hours as needed for nausea, Percocet 1 tablet every 4 hours as needed for pain and potassium 20 mEq daily. ALLERGIES:  SHE IS ALLERGIC TO CODEINE WHICH CAUSES HIVES, LISINOPRIL WHICH CAUSES SWELLING AND SULFA WHICH CAUSES HIVES. SHE ALSO HAS A NEW ALLERGY TO AMITRIPTYLINE. SOCIAL HISTORY:  She has not smoked in many years and even when she did it was rare use. She does not drink alcohol. FAMILY HISTORY:  Significant for a mother who  of leukemia. REVIEW OF SYSTEMS:  CONSTITUTIONAL:  She has not had any fevers or chills, but she does note that she seems to be cold all the time. She did have a dramatic weight loss with effort, but states that this has gone up slightly more recently. HEENT:  No problems with vision or hearing. RESPIRATORY:  No cough or shortness of breath. CARDIOVASCULAR:  No chest pain or heart problems. GASTROINTESTINAL:  She states that she has occasional problems with constipation, but for the most part her bowels are normal.  GENITOURINARY:  No problems with urination, but it sounds like her kidneys have been watched closely at times. MUSCULOSKELETAL:  She does have the history of arthritis. NEUROLOGIC:  She does have the history of stroke with the residual weakness. She also notes some weakness in the left lower extremity, which is felt to be secondary to her knee arthroplasty. INTEGUMENTARY:  As per the history of present illness. HEMATOLOGIC:  She does have troubles with easy bruising, but denies any history of troubles with bleeding. ENDOCRINE: Diet controlled DM, hypothyroid. PHYSICAL EXAMINATION:  VITAL SIGNS:  Temperature is 97.7 with a heart rate 94, blood pressure 152/87 and a saturation of 99% on room air. GENERAL:  Patient is an obese female in no apparent distress.   HEENT:  Her sclerae have normal color and mucous membranes are moist.  CARDIAC:  Regular. PULMONARY:  Clear to auscultation bilaterally. ABDOMEN:  Not tender. EXTREMITIES:  She is noted to have discoloration in the gaiter region on the left. On the right in the gaiter region, this is more erythematous when compared to the left. The edema is also more significant on that side. On the posterior aspect of the leg, there is an approximately 9 cm wide x 12 cm long open wound. There are several small areas of fibrin or slough, but overall this is pink. There are some areas of hypergranulation tissue. I did not feel fluctuance. VASCULAR:  I am not able to feel a pedal pulse on the right. NEUROLOGIC:  She does have some notable weakness on the right side of her face. LABORATORY DATA:  Patient's white blood cell count is 15.8 with a hemoglobin 11.2, hematocrit 33.7 and a platelet count of 608 with a sodium 133, potassium 3.6, chloride 99, bicarb 22, BUN 15, creatinine 1.3 and a glucose of 209. ASSESSMENT AND PLAN:  Patient is a 58-year-old female, who at this point appears to have bilateral venous stasis disease with discoloration in the gaiter region bilaterally and her noted leg swelling. I think she also has arterial insufficiency, and it sounds like this is rest pain on the right, especially as she does so much better with the foot dependent. She has had a stent placed on the left and has had no vascular treatment on the right. I think that on the right there is a combination of vascular and arterial insufficiency which will complicate her healing. I discussed her care with the hospitalist admitting her. She will need a vascular workup to see if she has sufficient arterial supply which is needed for healing. Also, without this, she may not be able to elevate the leg enough to help with the edema. As far as the leg, I do not see a drainable fluid collection.   There is the little bit of fibrin which could likely be cleared up by Santyl. I agree with her primary care's decision to send her to wound care, where I think that she would get good wound management as I don't think surgical debridement is needed currently. .  I also think that should she meet criteria by her vascular studies, she could be a candidate for hyperbaric oxygen. At this point, the plan is to admit her for IV antibiotics. There is not a current surgical indication, so I have spoken with the internist, and he will proceed with the vascular workup and evaluation by wound care and contact us if they feel that something surgical needs to be done in the future.       Queta Gill MD       VAS / PN  D: 11/08/2018 18:18     T: 11/08/2018 18:58  JOB #: [id number]

## 2018-11-09 NOTE — PROGRESS NOTES
Assume care of patient from ERNESTO Jin. (Precepting) with Jostin Perry RN with patient care. Patient received in bed awake. Patient A&Ox4, denies pain and discomfort. No distress noted. Frequently use items within reach. Head of bed elevated and bed locked in low position. Call bell within reach and patient verbalized understanding of use for assistance and needs. 0930Calbrandan Gilmore from [facility] called this nurse regarding patient going down for an TOMEKA today. She stated she will not be able to get the patient until later on in the day.

## 2018-11-09 NOTE — PROGRESS NOTES
Problem: Mobility Impaired (Adult and Pediatric) Goal: *Acute Goals and Plan of Care (Insert Text) Physical Therapy Goals Initiated 11/9/2018 and to be accomplished within 7 day(s) 1. Patient will move from supine to sit and sit to supine  in bed with modified independence. 2.  Patient will transfer from bed to chair and chair to bed with modified independence using the least restrictive device. 3.  Patient will perform sit to stand with modified independence. 4.  Patient will ambulate with modified independence for 100 feet with the least restrictive device. 5.  Patient will ascend/descend 5 stairs with handrail(s) with modified independence. Outcome: Progressing Towards Goal 
PHYSICAL THERAPY: Initial Assessment INPATIENT: Commercial: Hospital Day: 2 Patient: Betzaida Vang (53 y.o. female)    Date: 11/9/2018 Primary Diagnosis: Cellulitis of leg Precautions: Fall FWB  
PLOF: modified independent ASSESSMENT : 
Patient supine in bed upon meeting, cooperative to PT. Mod I for bed mobility to roll to L. Supervision for supine to sit at EOB, good static and dynamic sitting balance at EOB for approx. 15 min during dynamic reaching tasks and MMT. Supervision for sit to  RW, supervision to amb 30 feet in room with RW and return to sitting at EOB. Verbal cues provided for safe hand placement when lowering to bed. Supervision for second sit to  RW with good static and dynamic standing balance. Patient declined to conduct bed to chair transfer as she reported she had been sitting at EOB for breakfast already. Supervision for sit to supine in bed, patient left in bed with HOB elevated and R LE elevated on 2 pillows. All needs within reach. RN aware. Plan to assess further gait training and stairs training at next session. Patient presents with deficits in: 
Transfers, Gait, Strength, Range of Motion, Balance, Stairs, Precautions and Skin Integrity/Hygiene Patient will benefit from skilled intervention to address the above impairments. Patients rehabilitation potential is considered to be Good Factors which may influence rehabilitation potential include:  
[]         None noted 
[]         Mental ability/status [x]         Medical condition 
[x]         Home/family situation and support systems 
[x]         Safety awareness 
[]         Pain tolerance/management 
[]         Other: PLAN : 
Recommendations and Planned Interventions: 
[x]           Bed Mobility Training             [x]    Neuromuscular Re-Education 
[x]           Transfer Training                   []    Orthotic/Prosthetic Training 
[x]           Gait Training                          [x]    Modalities [x]           Therapeutic Exercises          [x]    Edema Management/Control 
[x]           Therapeutic Activities            [x]    Patient and Family Training/Education 
[]           Other (comment): EDUCATION:  
Education:  Patient was educated on the following topics: Bed mobility, transfers, ADLs, balance, amb, safety, exercise, role of PT, plan of care, cognition, skin integrity, vitals Barriers to Learning/Limitations: None Compensate with: visual, verbal, tactile, kinesthetic cues/model Recommendations for the next treatment session: gait training, stairs training Frequency/Duration: Patient will be followed by physical therapy 3-5 times a week to address goals. Discharge Recommendations: Home Health Further Equipment Recommendations for Discharge: rolling walker; has Factors which may impact discharge planning: stairs, home setup SUBJECTIVE:  
Patient stated Thank you.  OBJECTIVE DATA SUMMARY:  
 
Past Medical History:  
Diagnosis Date  Anemia  Arthritis   
 both knee  [facility] Diet controlled  HLD (hyperlipidemia)  Hypertension  PAD (peripheral artery disease) ([facility])   
 s/p L TP trunk athrectomy (05/2013)  Stroke ([facility] 2009  
 
Past Surgical History:  
Procedure Laterality Date  HX KNEE REPLACEMENT    
 left  HX TUMOR REMOVAL    
 soft palate Eval Complexity: History: MEDIUM  Complexity : 1-2 comorbidities / personal factors will impact the outcome/ POC Exam:MEDIUM Complexity : 3 Standardized tests and measures addressing body structure, function, activity limitation and / or participation in recreation  Presentation: MEDIUM Complexity : Evolving with changing characteristics  Clinical Decision Making:Medium Complexity [facility] Standing Balance Scale Overall Complexity:MEDIUM 
 
G CODES:Mobility [id number] Current  CK= 40-59%   Goal  CI= 1-19%. The severity rating is based on the Other [facility] Balance Scale [address] Standing Balance Scale 3/5 
0: Pt performs 25% or less of standing activity (Max assist) CN, 100% impaired. 1: Pt supports self with upper extremities but requires therapist assistance. Pt performs 25-50% of effort (Mod assist) CM, 80% to <100% impaired. 1+: Pt supports self with upper extremities but requires therapist assistance. Pt performs >50% effort. (Min assist). CL, 60% to <80% impaired. 2: Pt supports self independently with both upper extremities (walker, crutches, parallel bars). CL, 60% to <80% impaired. 2+: Pt support self independently with 1 upper extremity (cane, crutch, 1 parallel bar). CK, 40% to <60% impaired. 3: Pt stands without upper extremity support for up to 30 seconds. CK, 40% to <60% impaired. 3+: Pt stands without upper extremity support for 30 seconds or greater. CJ, 20% to <40% impaired. 4: Pt independently moves and returns center of gravity 1-2 inches in one plane. CJ, 20% to <40% impaired. 4+: Pt independently moves and returns center of gravity 1-2 inches in multiple planes. CI, 1% to <20% impaired. 5: Pt independently moves and returns center of gravity in all planes greater than 2 inches. CH, 0% impaired. Prior Level of Function/Home Situation:  
Home Situation Home Environment: Apartment # Steps to Enter: 5 Rails to Enter: Yes Hand Rails : Bilateral 
One/Two Story Residence: Two story, live on 1st floor Living Alone: No 
Support Systems: Spouse/Significant Other/Partner Patient Expects to be Discharged to[de-identified] [id number] Current DME Used/Available at Home: Chantell Abdul, karinCritical Behavior: 
Neurologic State: Alert Orientation Level: Oriented X4 Cognition: Appropriate decision making; Appropriate for age attention/concentration; Appropriate safety awareness Safety/Judgement: Awareness of environment;Good awareness of safety precautions Psychosocial 
Patient Behaviors: Calm; Cooperative Manual Muscle Testing (LE) 
       R     L Hip Flexion:   5/5 5/5 Knee EXT:   5/5 5/5 Knee FLEX:   5/5 5/5 Ankle DF:   5/5 5/5 
_________________________________________________ Tone : WFLSensation: [facility] Range Of Motion: [facility] Functional Mobility: 
 
 
Functional Status Indep (I) Mod I Super-vision Min A Mod A Max A Total A Assist x2 Verbal cues Additional time Not tested Comments Rolling []  [x]  [] []    []    []  []  [] [] [] [] Supine to sit []  []  [x] []  []  []  []  [] [] [] [] Sit to supine []  []  [x] []  []  []  []  [] [] [] [] Sit to stand []  []  [x] []  []  []  []  [] [] [] []   
Stand to sit []  []  [x] []  []  []  []  [] [] [] [] Bed to chair transfers []  []  [] []  []  []  []  [] [] [] [x] Balance Good [address] Poor Unable Not tested Comments Sitting static [x]  []  []  []  [] Sitting dynamic [x]  []  []  []  []   
Standing static [x]  []  []  []  []   
Standing dynamic [x]  []  []  []  [] Mobility/Gait:  
Level of Assistance: Supervision Assistive Device: rolling walker Distance Ambulated: 30 feet Left Lower Extremity: FWB Right Lower Extremity: FWB Base of Support: [facility] Speed/Liliana: [facility] Step Length: [facility] Swing Pattern: [facility] Stance: [facility] Gait Abnormalities: [facility] Pain: 
Pre treatment pain level: 2/10 Post treatment pain level: 2/10 Activity Tolerance:  
Good Please refer to the flowsheet for vital signs taken during this treatment. After treatment:  
[]         Patient left in no apparent distress sitting up in chair 
[x]         Patient left in no apparent distress in bed 
[x]         Call bell left within reach [x]         Nursing notified 
[]         Caregiver present 
[]         Bed alarm activated COMMUNICATION/EDUCATION:  
[x]         Fall prevention education was provided and the patient/caregiver indicated understanding. [x]         Patient/family have participated as able in goal setting and plan of care. [x]         Patient/family agree to work toward stated goals and plan of care. []         Patient understands intent and goals of therapy, but is neutral about his/her participation. []         Patient is unable to participate in goal setting and plan of care. Thank you for this referral. 
Dayana Acuna, SPT Time Calculation: 20 mins

## 2018-11-09 NOTE — PROGRESS NOTES
At approximately [phone],  conducted a follow-up consultation and spiritual assessment for Cheli Martínez, who is a 58 y.o. female. Patient had expressed interest in executing an Advance Medical Directive (AMD), but she has been busy when chaplains have stopped by to assist her. This evening, patient was eating her dinner. The  provided the following interventions: 
Continued the relationship of care and support. Listened empathically. Offered to leave with patient an AMD form for her to read over before executing tomorrow, with the 's assistance. Patient was appreciative and did not need an overview of the form, because she was familiar with the basics. Offered prayer at patient's bedside, as well as assurance of continued prayer on patient's behalf. Left patient a daily devotional. 
Chart reviewed. The following outcome was achieved: 
Patient expressed gratitude for the 's visit. Assessment: 
There are no spiritual or Catholic issues which require further Spiritual Care Services interventions at this time. Plan: 
Chaplains will continue to follow and will provide pastoral care on an as-needed/requested basis. [facility] 
Board Certified  [phone]   
[phone]

## 2018-11-09 NOTE — PROGRESS NOTES
Received awake,alert,in no acute distress. Hob elevated. Call light,phone in reach.  with pt. Fall precautions. 2200 Right leg elevated on 2 pillows. 5290 Noted small amt of red bloody drainage on bed pad from  right calf & heel wounds. Wounds cleansed with wound cleanser & mepilex dressing applied. Pt tolerated procedure well. .0600 Kierra Hayward has been paged x 2 & notified at 0100 that [address] not effective for pain per pt. No new order received.

## 2018-11-09 NOTE — PROGRESS NOTES
Problem: Pressure Injury - Risk of 
Goal: *Prevention of pressure injury Document Apolinar Scale and appropriate interventions in the flowsheet. Outcome: Progressing Towards Goal 
Pressure Injury Interventions: 
  
 
Moisture Interventions: Absorbent underpads Activity Interventions: Pressure redistribution bed/mattress(bed type) Mobility Interventions: HOB 30 degrees or less, Pressure redistribution bed/mattress (bed type) Nutrition Interventions: Document food/fluid/supplement intake

## 2018-11-09 NOTE — PROGRESS NOTES
Problem: Diabetes Self-Management Goal: *Patient Specific Goal (EDIT GOAL, INSERT TEXT) Blood sugar controlled with diet.

## 2018-11-09 NOTE — ROUTINE PROCESS
Bedside and Verbal shift change report given to Asia bryan rn (oncoming nurse) by alonso hogan rn (offgoing nurse). Report given with SBAR, Kardex, Intake/Output and Recent Results.

## 2018-11-09 NOTE — DIABETES MGMT
Diabetes Patient/Family Education RecordFactors That  May Influence Patients Ability  to Learn or  Comply with Recommendations []   Language barrier    []   Cultural needs   []   Motivation  
 []   Cognitive limitation    []   Physical   []   Education  
 []   Physiological factors   []   Hearing/vision/speaking impairment   []   Church beliefs []   Financial factors   []  Other:   [x]  No factors identified at this time. Person Instructed: [x]   Patient   []   Family   []  Other Preference for Learning: 
 [x]   Verbal   [x]   Written Diabetes Education [employer] 5 days menus and CHO counting information Target BG and A1C   []  Demonstration Level of Comprehension & Competence:   
[x]  Good                                      [] Fair                                     []  Poor                             []  Needs Reinforcement  
[x]  Teachback completed Education Component:  
[x]  Medication management, including how to administer insulin (if appropriate) and potential medication interactions [x]  Nutritional management   
[]  Exercise  
[]  Signs, symptoms, and treatment of hyperglycemia and hypoglycemia  
[] Prevention, recognition and treatment of hyperglycemia and hypoglycemia [x]  Importance of blood glucose monitoring and how to obtain a blood glucose meter   
[]  Instruction on use of the blood glucose meter [x]  Discuss the importance of HbA1C monitoring   
[]  Sick day guidelines  
[]  Proper use and disposal of lancets, needles, syringes or insulin pens (if appropriate) [x]  Potential long-term complications (retinopathy, kidney disease, neuropathy, foot care) [x] Information about whom to contact in case of emergency or for more information   
[x]  Goal:  Patient/family will demonstrate understanding of Diabetes Self Management Skills by: [id number] Plan for post-discharge education or self-management support: [x] Outpatient class schedule provided            [] Patient Declined 
  [] Scheduled for outpatient classes (date) _______ Pt states she attended outpatient DM education classes at [facility] in 2009. She reports she was once on metformin from about 2009-1010 but it was stopped due to low blood sugar. She has not been on DM medications since then. She has a glucometer from [facility] and her BG readings are usually in the low 100's but they have recently been higher, close to 200 with her foot/leg infection. Pt states she has \"supplemental health insurance\". Marquez Simeon RD, CDE Office:  [phone] [place] Pager:  [phone]

## 2018-11-09 NOTE — PROGRESS NOTES
Problem: Falls - Risk of 
Goal: *Absence of Falls Document David Curiel Fall Risk and appropriate interventions in the flowsheet. Outcome: Progressing Towards Goal 
Fall Risk Interventions: 
Mobility Interventions: Communicate number of staff needed for ambulation/transfer, Patient to call before getting OOB Elimination Interventions: Patient to call for help with toileting needs, Call light in reach History of Falls Interventions: Door open when patient unattended

## 2018-11-09 NOTE — PROGRESS NOTES
Internal Medicine Progress Note Patient's Name: Nakul Saldivar Admit Date: 11/8/2018 Length of Stay: 1 Assessment/Plan Active Hospital Problems Diagnosis Date Noted  Cellulitis of leg 11/08/2018  Sepsis ([facility] 11/08/2018  Dyslipidemia 05/01/2013  PAD (peripheral artery disease) ([facility]  Hypertension 03/28/2013  Diabetes mellitus ([facility] 03/28/2013 Sepsis 2/2 cellulitis - Cont Vanc, Zosyn 
- wound care following - awaiting TOMEKA - if abnormal, will consult vascular surgery 
- PT/OT  
- home health eventually HTN 
- cont home medications Dyslipidemia 
- cont home medications Diabetes 
- SSI 
- monitor BG 
 
- Cont acceptable home medications for chronic conditions  
- DVT protocol I have personally reviewed all pertinent labs and films that have officially resulted over the last 24 hours. I have personally checked for all pending labs that are awaiting final results. Subjective 11/9: Pt s/e @ bedside. No major events overnight. Reports RLE pain. Denies CP or SOB. Denies abd pain, N/V, numbness. Objective Visit Vitals /77 (BP 1 Location: Right arm, BP Patient Position: Supine) Pulse (!) 107 Temp 99.4 °F (37.4 °C) Resp 16 Ht 5' 6\" (1.676 m) Wt 120.2 kg (265 lb) SpO2 94% BMI 42.77 kg/m² Physical Exam: 
General Appearance: NAD, conversant HENT: normocephalic/atraumatic, moist mucus membranes Neck: No JVD, supple Lungs: CTA with normal respiratory effort CV: RRR, no m/r/g Abdomen: soft, non-tender, normal bowel sounds Extremities:  Large ulcer to posterior right lower leg with exudate and surrounding erythema from the ankle to mid-shin and calf. Neuro: No focal deficits, motor/sensory intact Skin: Normal color, intact Psych: appropriate affect, alert and oriented to person, place and time Intake and Output: 
Current Shift:  11/09 0701 - 11/09 1900 In: 240 [P.O.:240] Out: 700 [Urine:700] Last three shifts:  11/07 1901 - 11/09 0700 In: 710 [P.O.:360; I.V.:350] Out: 1450 [LPREE:2195] Lab/Data Reviewed: 
BMP:  
Lab Results Component Value Date/Time  11/09/2018 04:55 AM  
 K 4.8 11/09/2018 04:55 AM  
  11/09/2018 04:55 AM  
 CO2 22 11/09/2018 04:55 AM  
 AGAP 9 11/09/2018 04:55 AM  
  (H) 11/09/2018 04:55 AM  
 BUN 12 11/09/2018 04:55 AM  
 CREA 1.09 11/09/2018 04:55 AM  
 GFRAA >60 11/09/2018 04:55 AM  
 GFRNA 51 (L) 11/09/2018 04:55 AM  
 
CBC:  
Lab Results Component Value Date/Time WBC 15.6 (H) 11/09/2018 04:55 AM  
 HGB 9.9 (L) 11/09/2018 04:55 AM  
 HCT 30.7 (L) 11/09/2018 04:55 AM  
  11/09/2018 04:55 AM  
 
 
Imaging Reviewed: 
[employer] [facility] Result Date: 11/8/2018 Chest, single view Indication: Cellulitis of the right lower extremity. Comparison: 10/5/2018 Findings:  Portable upright AP view of the chest was obtained. The cardiomediastinal silhouette is within normal limits. The pulmonary vasculature is unremarkable. Lung parenchyma is well aerated, without focal consolidation. No pleural effusion nor pneumothorax. No acute osseous abnormality. Impression: No radiographic evidence of an acute abnormality. Medications Reviewed: 
Current Facility-Administered Medications Medication Dose Route Frequency  sodium chloride (NS) flush 5-10 mL  5-10 mL IntraVENous PRN  
 VANCOMYCIN INFORMATION NOTE   Other PRN  
 amLODIPine (NORVASC) tablet 5 mg  5 mg Oral DAILY  aspirin delayed-release tablet 81 mg  81 mg Oral DAILY  hydroCHLOROthiazide (HYDRODIURIL) tablet 25 mg  25 mg Oral DAILY  gabapentin (NEURONTIN) capsule 100 mg  100 mg Oral BID  levothyroxine (SYNTHROID) tablet 25 mcg  25 mcg Oral ACB  losartan (COZAAR) tablet 50 mg  50 mg Oral DAILY  ondansetron (ZOFRAN ODT) tablet 4 mg  4 mg Oral Q8H PRN  
 oxyCODONE-acetaminophen (PERCOCET) 5-325 mg per tablet 1 Tab  1 Tab Oral Q4H PRN  
  0.9% sodium chloride infusion  50 mL/hr IntraVENous CONTINUOUS  
 enoxaparin (LOVENOX) injection 40 mg  40 mg SubCUTAneous Q24H  
 insulin lispro (HUMALOG) injection   SubCUTAneous AC&HS  
 glucose chewable tablet 16 g  4 Tab Oral PRN  
 glucagon (GLUCAGEN) injection 1 mg  1 mg IntraMUSCular PRN  
 dextrose (D50) infusion 12.5-25 g  25-50 mL IntraVENous PRN  
 vancomycin (VANCOCIN) 1,250 mg in 0.9% sodium chloride 250 mL IVPB  1,250 mg IntraVENous Q18H  
 [START ON 11/10/2018] VANCOMYCIN INFORMATION NOTE   Other ONCE  piperacillin-tazobactam (ZOSYN) 3.375 g in 0.9% sodium chloride (MBP/ADV) 100 mL MBP  3.375 g IntraVENous Q8H Arslan Bergman PA-C [address] [place], [place] [address] Office:  [phone] Pager: [phone]

## 2018-11-09 NOTE — PROGRESS NOTES
Problem: Self Care Deficits Care Plan (Adult) Goal: *Acute Goals and Plan of Care (Insert Text) Occupational Therapy Goals Initiated 11/9/2018 within 7 day(s). 1.  Patient will perform grooming with supervision/set-up standing at the sink. 2.  Patient will perform bathing with supervision/set-up. 3.  Patient will perform lower body dressing with supervision/set-up and use of AEs as needed. 4.  Patient will perform toilet transfers with supervision/set-up. 5.  Patient will perform all aspects of toileting with supervision/set-up. 6.  Patient will participate in upper extremity therapeutic exercise/activities with independence for 10 minutes. 7.  Patient will utilize energy conservation techniques during functional activities with verbal, visual and tactile cues. Occupational Therapy EVALUATION Patient: Tan Kidney (65 y.o. female) Date: 11/9/2018 Primary Diagnosis: Cellulitis of leg Precautions:   Fall PLOF: mod I for transfers and independent with ADLs. ASSESSMENT : 
Based on the objective data described below, the patient presents with decreased strength and balance following above medical diagnosis. Pt this session participated with bed mobility, STS transfers and LB dressing. Pt demo decreased standing dynamic balance due to increased swelling at RLE. Pt was left supine in bed at the end of session in NAD. Patient will benefit from skilled intervention to address the above impairments. Patients rehabilitation potential is considered to be Good Factors which may influence rehabilitation potential include:  
[x]             None noted []             Mental ability/status []             Medical condition []             Home/family situation and support systems []             Safety awareness []             Pain tolerance/management 
[]             Other:  
 
Recommendations for nursing: 
Written on communication board:  
Verbally communicated to: PLAN : 
 Recommendations and Planned Interventions: 
[x]               Self Care Training                  [x]        Therapeutic Activities [x]               Functional Mobility Training    []        Cognitive Retraining 
[x]               Therapeutic Exercises           []        Endurance Activities [x]               Balance Training                   []        Neuromuscular Re-Education []               Visual/Perceptual Training     [x]   Home Safety Training 
[x]               Patient Education                 []        Family Training/Education []               Other (comment): Frequency/Duration: Patient will be followed by occupational therapy 1-2 times per day/4-7 days per week to address goals. Discharge Recommendations: Home Health Further Equipment Recommendations for Discharge: N/A; pt has DMEs at home SUBJECTIVE:  
Patient stated The swelling in my legs is messing my balance.  OBJECTIVE DATA SUMMARY:  
 
Past Medical History:  
Diagnosis Date  Anemia  Arthritis   
 both knee  Diabetes ([facility] Diet controlled  HLD (hyperlipidemia)  Hypertension  PAD (peripheral artery disease) ([facility])   
 s/p L TP trunk athrectomy (05/2013)  Stroke ([facility] 2009 Past Surgical History:  
Procedure Laterality Date  HX KNEE REPLACEMENT    
 left  HX TUMOR REMOVAL    
 soft palate Barriers to Learning/Limitations: None Compensate with: visual, verbal, tactile, kinesthetic cues/model GCODES:  Self Care  Current  CK= 40-59%  Goal  CI= 1-19%. The severity rating is based on the Other participation with ADLs and transfers. Eval Complexity: History: LOW Complexity : Brief history review ; Examination: LOW Complexity : 1-3 performance deficits relating to physical, cognitive , or psychosocial skils that result in activity limitations and / or participation restrictions ;  Decision Making:MEDIUM Complexity : Patient may present with comorbidities that affect occupational performnce. Miniml to moderate modification of tasks or assistance (eg, physical or verbal ) with assesment(s) is necessary to enable patient to complete evaluation Prior Level of Function/Home Situation:  
Home Situation Home Environment: Apartment # Steps to Enter: 5 Rails to Enter: Yes Hand Rails : Bilateral 
One/Two Story Residence: Two story, live on 1st floor Living Alone: No 
Support Systems: Spouse/Significant Other/Partner Patient Expects to be Discharged to[de-identified] [facility] Current DME Used/Available at Home: Shower chair, Raised toilet seat, Walker, rolling Tub or Shower Type: Shower [x]  Right hand dominant   []  Left hand dominantCognitive/Behavioral Status: 
Neurologic State: Alert Orientation Level: Oriented X4 Cognition: Appropriate for age attention/concentration; Follows commands Safety/Judgement: Fall prevention Skin: intact Edema: none Vision/Perceptual:   
Tracking: Able to track stimulus in all quadrants w/o difficulty Coordination: 
Coordination: Generally decreased, functional 
Fine Motor Skills-Upper: Left Intact; Right Intact Gross Motor Skills-Upper: Left Intact; Right Intact Balance: 
Sitting: Intact Standing: Impaired Standing - Static: Fair Standing - Dynamic : FairStrength: 
Strength: Generally decreased, functional 
Tone & Sensation: 
Sensation: Intact Range of Motion: 
AROM: Generally decreased, functional 
Functional Mobility and Transfers for ADLs: 
Bed Mobility: 
Rolling: Stand-by assistance Supine to Sit: Stand-by assistance Sit to Supine: Minimum assistance Transfers: 
Sit to Stand: Minimum assistance;Contact guard assistance ADL Assessment: 
Feeding: Independent Oral Facial Hygiene/Grooming: Setup Bathing: Minimum assistance Upper Body Dressing: Independent Lower Body Dressing: Moderate assistance Toileting: Minimum assistance; Moderate assistance ADL Intervention: Lower Body Dressing Assistance Dressing Assistance: Moderate assistance Pants With Elastic Waist: Moderate assistance Socks: Moderate assistance Leg Crossed Method Used: No 
Position Performed: Bending forward method;Seated edge of bed Cues: Carter Suarez 
 
Cognitive Retraining Safety/Judgement: Fall prevention Therapeutic Exercise: 
 
Pain: 
Pre treatment pain level:   
Post treatment pain level:  
Pain Scale 1: Numeric (0 - 10) Pain Intensity 1: 0 Pain Location 1: Knee;Leg 
Pain Orientation 1: Right Pain Description 1: Aching Pain Intervention(s) 1: Medication (see MAR) Activity Tolerance:  
Fair Please refer to the flowsheet for vital signs taken during this treatment. After treatment:  
[] Patient left in no apparent distress sitting up in chair 
[x] Patient left in no apparent distress in bed 
[x] Call bell left within reach 
[] Nursing notified 
[] Caregiver present 
[] Bed alarm activated COMMUNICATION/EDUCATION:  
[x] Home safety education was provided and the patient/caregiver indicated understanding. [x] Patient/family have participated as able in goal setting and plan of care. [x] Patient/family agree to work toward stated goals and plan of care. [] Patient understands intent and goals of therapy, but is neutral about his/her participation. [] Patient is unable to participate in goal setting and plan of care. Thank you for this referral. 
Phi Smith OTR/L Time Calculation: 18 mins

## 2018-11-09 NOTE — PROGRESS NOTES
Reason for Admission:   r leg ulcer RRAT Score:     19 Do you (patient/family) have any concerns for transition/discharge? no 
           
Plan for utilizing home health:     yes Likelihood of readmission?   low Transition of Care Plan:  Spoke with pt, lives with spouse. Independent with adls and amb with a walker. Also uses cane. Has elevated toilet seat and a walk in shower. Has dsg to rt leg. Offered hh services for wd care at home, declines at this time. pcp dr Earl Santamaria. Plan home , refuses hh at this time. Patient has designated ______spouse__________________ to participate in his/her discharge plan and to receive any needed information. Name: Shine Wolff Address: 
Phone number: [phone]

## 2018-11-09 NOTE — DIABETES MGMT
NUTRITIONAL ASSESSMENT GLYCEMIC CONTROL/ PLAN OF CARE Tan Kidney           62 y.o.           11/8/2018 1. Ulcer of lower extremity with fat layer exposed, right ([address] 2. Cellulitis of left lower extremity 3. Failure of outpatient treatment INTERVENTIONS/PLAN:  
1. Will adjust diet to 1800 calories to ensure adequate energy for wound healing. 2.  With A1C of 9.9%, pt may benefit from receiving dual pharmacologic therapy at discharge. Could consider metformin/sitaglipton (Januvia) or metformin/glipizide. 3.  On-going diabetes education. See diabetes education record for details. ASSESSMENT:  
Nutritional Status:  Pt is  204% ideal wt; pt appears well nourished with adequate po intake. Nutrition Diagnoses: Altered nutrition related labs due to diabetes as evidenced by A1C of 9.9%. Increased nutrient needs due to wound healing as evidenced by right foot infection. Morbid obesity due to excess energy intake as evidenced by BMI of 42.8 kg/m2. Diabetes Management: Pt states she attended outpatient DM education classes at [facility] in 2009. She reports she was once on metformin from about [id number] but it was stopped due to low blood sugar. She has not been on DM medications since then. She has a glucometer from [facility] and her BG readings are usually in the low 100's but they have recently been higher, close to 200 with her foot/leg infection. Pt states she has \"supplemental health insurance\". Recent blood glucose:    
11/9/18:  142, 152, 167 - received 2 units corrective lispro thus far today 11/8/18:  219 - received 4 units corrective lispro Within target range (non-ICU: <140; ICU<180): [] Yes   []  No    X varied Current Insulin regimen:  
Corrective lispro, normal insulin sensitivity ACHS Home medication/insulin regimen: none HbA1c:  9.9% - ave BG has been ~ 237 mg/dL over past 3 months.  
Adequate glycemic control PTA:  [] Yes  [x] No 
 1/25/13  A1C (Care Everywhere) - 5.6% SUBJECTIVE/OBJECTIVE: Information obtained from: chart review, pt Pt admitted with right foot infection, cellulitis and PMHx of T2DM, HTN, dyslipidemia, PAD, stent left leg, CVA 2009) 
11/9/18:  Pt reports she once weighed 300 lbs, intentionally lost 100 lbs in [id number] when she found out she had T2DM but then had knee issue where she could not exercise and she regained 60 lbs. Her recent weight has been stable. No known food allergies and pt denies problems with chewing/swallowing. Diet: consistent CHO 1.5 gram Na Patient Vitals for the past 100 hrs: 
 % Diet Eaten 11/09/18 0909 50 % Medications: [x]                Reviewed IVF:  NS at 30 ml/hr Most Recent POC Glucose:  
Recent Labs 11/09/18 
0455 11/08/18 
1613 * 209* Labs:  
Lab Results Component Value Date/Time Hemoglobin A1c 9.9 (H) 11/08/2018 04:13 PM  
 
Lab Results Component Value Date/Time Hemoglobin A1c 9.9 (H) 11/08/2018 04:13 PM  
 Hemoglobin A1c 5.7 04/02/2012 09:49 AM  
 Hemoglobin A1c 4.7 (L) 12/08/2011 03:10 PM  
 
Lab Results Component Value Date/Time Sodium 138 11/09/2018 04:55 AM  
 Potassium 4.8 11/09/2018 04:55 AM  
 Chloride 107 11/09/2018 04:55 AM  
 CO2 22 11/09/2018 04:55 AM  
 Anion gap 9 11/09/2018 04:55 AM  
 Glucose 117 (H) 11/09/2018 04:55 AM  
 BUN 12 11/09/2018 04:55 AM  
 Creatinine 1.09 11/09/2018 04:55 AM  
 Calcium 8.3 (L) 11/09/2018 04:55 AM  
 Magnesium 2.1 01/26/2010 11:30 AM  
 Phosphorus 4.6 03/09/2010 02:44 PM  
 Albumin 4.0 10/05/2018 06:20 PM  
 
 
Anthropometrics: IBW : 59 kg (130 lb), % IBW (Calculated): 203.85 %, BMI (calculated): 42.8 Wt Readings from Last 1 Encounters:  
11/08/18 120.2 kg (265 lb) Wt Readings from Last 3 Encounters:  
11/08/18 120.2 kg (265 lb) 11/05/18 121.1 kg (267 lb) 10/22/18 119.7 kg (264 lb) Ht Readings from Last 1 Encounters:  
11/08/18 5' 6\" (1.676 m) Estimated Nutrition Needs:  2130 Kcals/day, Protein (g): 89 g Fluid (ml): 2100 ml Based on:   [x]          Actual BW    []          ABW   []            Adjusted BW   
 
Nutrition Interventions: 
Diabetes education Consistent CHO diet Goal:  
Blood glucose will be within target range of  mg/dL by 11/12/18. Pt will consume > 75% meals by 11/14/18. Nutrition Monitoring and Evaluation   
 
[x]     Monitor po intake on meal rounds 
[x]     Continue inpatient monitoring and intervention 
[]     Other: 
 
 
Nutrition Risk:  []   High     [x]  Moderate    []  Minimal/Uncompromised Sidra Rivera RD, CDE Office:  [phone] Long [place] Pager:  [phone]

## 2018-11-10 LAB
DATE LAST DOSE: NORMAL
ERYTHROCYTE [DISTWIDTH] IN BLOOD BY AUTOMATED COUNT: 14.3 % (ref 11.6–14.5)
GLUCOSE BLD STRIP.AUTO-MCNC: 127 MG/DL (ref 70–110)
GLUCOSE BLD STRIP.AUTO-MCNC: 162 MG/DL (ref 70–110)
GLUCOSE BLD STRIP.AUTO-MCNC: 177 MG/DL (ref 70–110)
GLUCOSE BLD STRIP.AUTO-MCNC: 201 MG/DL (ref 70–110)
HCT VFR BLD AUTO: 29.3 % (ref 35–45)
HGB BLD-MCNC: 9.6 G/DL (ref 12–16)
MCH RBC QN AUTO: 28 PG (ref 24–34)
MCHC RBC AUTO-ENTMCNC: 32.8 G/DL (ref 31–37)
MCV RBC AUTO: 85.4 FL (ref 74–97)
PLATELET # BLD AUTO: 399 K/UL (ref 135–420)
PMV BLD AUTO: 10.2 FL (ref 9.2–11.8)
RBC # BLD AUTO: 3.43 M/UL (ref 4.2–5.3)
REPORTED DOSE,DOSE: NORMAL UNITS
REPORTED DOSE/TIME,TMG: 2300
VANCOMYCIN TROUGH SERPL-MCNC: 14 UG/ML (ref 10–20)
WBC # BLD AUTO: 17.9 K/UL (ref 4.6–13.2)

## 2018-11-10 PROCEDURE — 97530 THERAPEUTIC ACTIVITIES: CPT

## 2018-11-10 PROCEDURE — 74011250636 HC RX REV CODE- 250/636: Performed by: HOSPITALIST

## 2018-11-10 PROCEDURE — 80202 ASSAY OF VANCOMYCIN: CPT

## 2018-11-10 PROCEDURE — 65270000029 HC RM PRIVATE

## 2018-11-10 PROCEDURE — 74011250637 HC RX REV CODE- 250/637: Performed by: HOSPITALIST

## 2018-11-10 PROCEDURE — 74011000258 HC RX REV CODE- 258: Performed by: HOSPITALIST

## 2018-11-10 PROCEDURE — 36415 COLL VENOUS BLD VENIPUNCTURE: CPT

## 2018-11-10 PROCEDURE — 85027 COMPLETE CBC AUTOMATED: CPT

## 2018-11-10 PROCEDURE — 74011636637 HC RX REV CODE- 636/637: Performed by: HOSPITALIST

## 2018-11-10 PROCEDURE — 82962 GLUCOSE BLOOD TEST: CPT

## 2018-11-10 PROCEDURE — 97116 GAIT TRAINING THERAPY: CPT

## 2018-11-10 PROCEDURE — 97535 SELF CARE MNGMENT TRAINING: CPT

## 2018-11-10 RX ORDER — ACETAMINOPHEN 325 MG/1
650 TABLET ORAL
Status: DISCONTINUED | OUTPATIENT
Start: 2018-11-10 | End: 2018-11-11 | Stop reason: HOSPADM

## 2018-11-10 RX ORDER — VANCOMYCIN/0.9 % SOD CHLORIDE 1 G/100 ML
1000 PLASTIC BAG, INJECTION (ML) INTRAVENOUS EVERY 12 HOURS
Status: DISCONTINUED | OUTPATIENT
Start: 2018-11-11 | End: 2018-11-11 | Stop reason: HOSPADM

## 2018-11-10 RX ADMIN — PIPERACILLIN SODIUM,TAZOBACTAM SODIUM 3.38 G: 3; .375 INJECTION, POWDER, FOR SOLUTION INTRAVENOUS at 07:01

## 2018-11-10 RX ADMIN — HYDROCHLOROTHIAZIDE 25 MG: 25 TABLET ORAL at 09:09

## 2018-11-10 RX ADMIN — GABAPENTIN 100 MG: 100 CAPSULE ORAL at 17:17

## 2018-11-10 RX ADMIN — INSULIN LISPRO 2 UNITS: 100 INJECTION, SOLUTION INTRAVENOUS; SUBCUTANEOUS at 08:23

## 2018-11-10 RX ADMIN — PIPERACILLIN SODIUM,TAZOBACTAM SODIUM 3.38 G: 3; .375 INJECTION, POWDER, FOR SOLUTION INTRAVENOUS at 14:40

## 2018-11-10 RX ADMIN — ACETAMINOPHEN 650 MG: 325 TABLET, FILM COATED ORAL at 00:17

## 2018-11-10 RX ADMIN — AMLODIPINE BESYLATE 5 MG: 5 TABLET ORAL at 09:09

## 2018-11-10 RX ADMIN — OXYCODONE HYDROCHLORIDE AND ACETAMINOPHEN 1 TABLET: 5; 325 TABLET ORAL at 14:40

## 2018-11-10 RX ADMIN — INSULIN LISPRO 4 UNITS: 100 INJECTION, SOLUTION INTRAVENOUS; SUBCUTANEOUS at 12:47

## 2018-11-10 RX ADMIN — ASPIRIN 81 MG: 81 TABLET, COATED ORAL at 09:09

## 2018-11-10 RX ADMIN — LOSARTAN POTASSIUM 50 MG: 50 TABLET ORAL at 09:09

## 2018-11-10 RX ADMIN — OXYCODONE HYDROCHLORIDE AND ACETAMINOPHEN 1 TABLET: 5; 325 TABLET ORAL at 22:42

## 2018-11-10 RX ADMIN — LEVOTHYROXINE SODIUM 25 MCG: 25 TABLET ORAL at 08:02

## 2018-11-10 RX ADMIN — INSULIN LISPRO 2 UNITS: 100 INJECTION, SOLUTION INTRAVENOUS; SUBCUTANEOUS at 22:43

## 2018-11-10 RX ADMIN — OXYCODONE HYDROCHLORIDE AND ACETAMINOPHEN 1 TABLET: 5; 325 TABLET ORAL at 08:02

## 2018-11-10 RX ADMIN — SODIUM CHLORIDE 50 ML/HR: 900 INJECTION, SOLUTION INTRAVENOUS at 19:13

## 2018-11-10 RX ADMIN — GABAPENTIN 100 MG: 100 CAPSULE ORAL at 09:09

## 2018-11-10 RX ADMIN — ENOXAPARIN SODIUM 40 MG: 100 INJECTION SUBCUTANEOUS at 20:51

## 2018-11-10 RX ADMIN — INSULIN GLARGINE 4 UNITS: 100 INJECTION, SOLUTION SUBCUTANEOUS at 22:44

## 2018-11-10 RX ADMIN — SODIUM CHLORIDE 1250 MG: 900 INJECTION, SOLUTION INTRAVENOUS at 17:26

## 2018-11-10 RX ADMIN — PIPERACILLIN SODIUM,TAZOBACTAM SODIUM 3.38 G: 3; .375 INJECTION, POWDER, FOR SOLUTION INTRAVENOUS at 22:43

## 2018-11-10 NOTE — PROGRESS NOTES
Problem: Falls - Risk of 
Goal: *Absence of Falls Document Leobardo Kins Fall Risk and appropriate interventions in the flowsheet. Outcome: Progressing Towards Goal 
Fall Risk Interventions: 
Mobility Interventions: Patient to call before getting OOB Medication Interventions: Patient to call before getting OOB Elimination Interventions: Call light in reach, Patient to call for help with toileting needs History of Falls Interventions: Door open when patient unattended

## 2018-11-10 NOTE — PROGRESS NOTES
Problem: Mobility Impaired (Adult and Pediatric) Goal: *Acute Goals and Plan of Care (Insert Text) Physical Therapy Goals Initiated 11/9/2018 and to be accomplished within 7 day(s) 1. Patient will move from supine to sit and sit to supine  in bed with modified independence. 2.  Patient will transfer from bed to chair and chair to bed with modified independence using the least restrictive device. 3.  Patient will perform sit to stand with modified independence. 4.  Patient will ambulate with modified independence for 100 feet with the least restrictive device. 5.  Patient will ascend/descend 5 stairs with handrail(s) with modified independence. Outcome: Progressing Towards Goal 
physical Therapy TREATMENT Patient: Nakul Saldivar (12 y.o. female) Date: 11/10/2018 Diagnosis: Cellulitis of leg Cellulitis of leg Precautions: Fall Chart, physical therapy assessment, plan of care and goals were reviewed. PLOF: ambulatory with a cane but in the last few week using a RW 
ASSESSMENT: 
No assistance needed to sit up EOB. Elevated bed to height of bed at home for sit to stand, no difficulty noted. Ambulated 120ft with RW, reciprocal gt pattern, steady slow pace, decreased step height and length, no LOB or path deviations. Pt returned to bed, (B)LEs elevated and foot of bed tilted up to assist with reducing edema. Education: safety Progression toward goals: 
[x]      Improving appropriately and progressing toward goals 
[]      Improving slowly and progressing toward goals 
[]      Not making progress toward goals and plan of care will be adjusted PLAN: 
Patient continues to benefit from skilled intervention to address the above impairments. Continue treatment per established plan of care. Discharge Recommendations:  Home Health Further Equipment Recommendations for Discharge:  Has a RW SUBJECTIVE:  
Patient stated My bed is tall at home.  OBJECTIVE DATA SUMMARY:  
 Critical Behavior: 
Neurologic State: Alert Orientation Level: Oriented X4 Cognition: Follows commands Safety/Judgement: Fall prevention Functional Mobility Training: 
Bed Mobility: 
Supine to Sit: Supervision; Additional time Sit to Supine: Stand-by assistance;Contact guard assistance; Additional time Transfers: 
Sit to Stand: Stand-by assistance Stand to Sit: Supervision Bed to Chair: Contact guard assistance(w/RW) Balance: 
Sitting: Intact Standing: Intact; With support Standing - Static: Good Standing - Dynamic : FairAmbulation/Gait Training: 
Distance (ft): 120 Feet (ft) Assistive Device: Gait belt;Walker, rolling Ambulation - Level of Assistance: Contact guard assistance;Stand-by assistance Gait Abnormalities: Antalgic;Decreased step clearance Speed/Liliana: Slow GCODE:n/a 
Pain: 
Pre:0 Post:0 Pain Scale 1: Numeric (0 - 10) Pain Intensity 1: 0 Pain Location 1: Leg 
Pain Orientation 1: Right Pain Description 1: Aching Pain Intervention(s) 1: Medication (see MAR); Elevation Activity Tolerance:  
Good Please refer to the flowsheet for vital signs taken during this treatment. After treatment:  
[] Patient left in no apparent distress sitting up in chair 
[x] Patient left in no apparent distress in bed 
[x] Call bell left within reach 
[] Nursing notified 
[] Caregiver present 
[] Bed alarm activated Chinmay Bowden PTA Time Calculation: 29 mins

## 2018-11-10 NOTE — PROGRESS NOTES
Problem: Pressure Injury - Risk of 
Goal: *Prevention of pressure injury Document Apolinar Scale and appropriate interventions in the flowsheet. Outcome: Progressing Towards Goal 
Pressure Injury Interventions: 
  
 
Moisture Interventions: Absorbent underpads Activity Interventions: Pressure redistribution bed/mattress(bed type) Mobility Interventions: HOB 30 degrees or less Nutrition Interventions: Document food/fluid/supplement intake Problem: Falls - Risk of 
Goal: *Absence of Falls Document Dionne Dennis Fall Risk and appropriate interventions in the flowsheet. Outcome: Progressing Towards Goal 
Fall Risk Interventions: 
Mobility Interventions: Patient to call before getting OOB Medication Interventions: Patient to call before getting OOB Elimination Interventions: Call light in reach History of Falls Interventions: Door open when patient unattended

## 2018-11-10 NOTE — PROGRESS NOTES
Problem: Pressure Injury - Risk of 
Goal: *Prevention of pressure injury Document Apolinar Scale and appropriate interventions in the flowsheet. Outcome: Progressing Towards Goal 
Pressure Injury Interventions: 
  
 
Moisture Interventions: Absorbent underpads Activity Interventions: Pressure redistribution bed/mattress(bed type) Mobility Interventions: HOB 30 degrees or less Nutrition Interventions: Document food/fluid/supplement intake

## 2018-11-10 NOTE — PROGRESS NOTES
Patient received in bed awake. Patient A&Ox4, c/o right leg pain 8/10; see MAR for prn Percocet to be given. Noted right leg elevated to pillow. Connor pedal pulses obtained via doppler, positive; skin warm to touch; able to wiggle toe. Dsg to posterior   right leg; CDI. No distress noted. Frequently use items within reach. Bed locked in low position. Call bell within reach and Patient verbalized understanding of use for assistance and needs. 4124- Patient temp 100.2; asymptomatic. Given prn Percocet this am; see above. Will recheck temperature. Call bell w/in reach. 0945- Patient sitting up at edge of bed with OT at bedside. F/u temp 99.9; continues to be asymptomatic. Call bell remains w/in reach.

## 2018-11-10 NOTE — ROUTINE PROCESS
Bedside and Verbal shift change report given to ERNESTO Jin (oncoming nurse) by Fay Shelby RN (offgoing nurse). Report included the following information SBAR, Kardex, Intake/Output, MAR and Recent Results.

## 2018-11-10 NOTE — PROGRESS NOTES
Problem: Self Care Deficits Care Plan (Adult) Goal: *Acute Goals and Plan of Care (Insert Text) Occupational Therapy Goals Initiated 11/9/2018 within 7 day(s). 1.  Patient will perform grooming with supervision/set-up standing at the sink. 2.  Patient will perform bathing with supervision/set-up. 3.  Patient will perform lower body dressing with supervision/set-up and use of AEs as needed. 4.  Patient will perform toilet transfers with supervision/set-up. 5.  Patient will perform all aspects of toileting with supervision/set-up. 6.  Patient will participate in upper extremity therapeutic exercise/activities with independence for 10 minutes. 7.  Patient will utilize energy conservation techniques during functional activities with verbal, visual and tactile cues. Outcome: Progressing Towards Goal 
Occupational Therapy TREATMENT Patient: Jamee De La Rosa (41 y.o. female) Date: 11/10/2018 Diagnosis: Cellulitis of leg Cellulitis of leg Precautions: Fall Chart, occupational therapy assessment, plan of care, and goals were reviewed. PLOF: Independent ASSESSMENT: 
Pt seen for am ADL retraining at EOB w/set-up. Pt requires increase time performing UB and grooming ADLs. Pt demonstrates bending forward technique threading BLE into pants. Decrease dynamic standing balance and body habitus requires assist w/clotihng mgt hiking pants over hips. Pt requires encouragement for OOB to chair. Pt requires CGA w/functional transfer to chair w/RW. Pt left in chair w/all needs within reach. EDUCATION Pt educate don energy conservation techniques w/ADLs and importance OOB. Progression toward goals: 
[x]          Improving appropriately and progressing toward goals 
[]          Improving slowly and progressing toward goals 
[]          Not making progress toward goals and plan of care will be adjusted PLAN: 
Patient continues to benefit from skilled intervention to address the above impairments. Continue treatment per established plan of care. Discharge Recommendations:  Home Health for safety check Further Equipment Recommendations for Discharge:  N/A, pt has DME SUBJECTIVE:  
Patient stated I've got a temperature, so I don't know how much I can do.  OBJECTIVE DATA SUMMARY: 
  
Cognitive/Behavioral Status: 
Neurologic State: Alert Orientation Level: Oriented X4 Cognition: Follows commands Safety/Judgement: Fall prevention Functional Mobility and Transfers for ADLs: 
 Bed Mobility: 
Supine to Sit: Additional time;Stand-by assistance Transfers: 
Sit to Stand: Stand-by assistance(from elevated surface w/RW) Bed to Chair: Contact guard assistance(w/RW) Balance: 
Sitting: Intact Standing: Impaired; With support Standing - Static: Good Standing - Dynamic : Fair ADL Intervention: 
Upper Body Bathing Bathing Assistance: Supervision/set-up Position Performed: Seated edge of bed Upper Body Dressing Assistance Bra: Independent Pullover Shirt: Supervision/set-up Lower Body Dressing Assistance Pants With Elastic Waist: Minimum assistance Leg Crossed Method Used: No 
Position Performed: Bending forward method;Seated edge of bed Cues: Don 
Pain: 
Pre Treatment:4 Post Treatment:4 Pain Scale 1: Numeric (0 - 10) Pain Intensity 1: 4 Pain Location 1: Leg 
Pain Orientation 1: Right Pain Description 1: Aching Pain Intervention(s) 1: Medication (see MAR); Elevation Activity Tolerance:   
Fair Please refer to the flowsheet for vital signs taken during this treatment. After treatment:  
[x]  Patient left in no apparent distress sitting up in chair 
[]  Patient left in no apparent distress in bed 
[x]  Call bell left within reach [x]  Nursing notified 
[]  Caregiver present 
[]  Bed alarm activated Lou Gomez Time Calculation: 29 mins

## 2018-11-10 NOTE — ROUTINE PROCESS
Bedside and Verbal shift change report given to Jeovany (oncoming nurse) by alonso hogan rn (offgoing nurse). Report given with SBAR, Kardex, Intake/Output and Recent Results.

## 2018-11-10 NOTE — PROGRESS NOTES
Received resting in bed with right leg elevated on 2 pillows. Dressing on right leg clean,dry,in tact. Call light,phone in reach. 11/10/18 0017 Tylenol 650 mg po for temp of 101 orally. Continue to monitor. 0110 Temp rechecked,100.8 orally. 7171 Afebrile,98.6 orally. Right leg elevation maintained. Dressing to right leg,dry,in tact.

## 2018-11-10 NOTE — PROGRESS NOTES
Problem: Pressure Injury - Risk of 
Goal: *Prevention of pressure injury Document Apolinar Scale and appropriate interventions in the flowsheet. Outcome: Progressing Towards Goal 
Pressure Injury Interventions: 
  
 
Moisture Interventions: Absorbent underpads Activity Interventions: Pressure redistribution bed/mattress(bed type) Mobility Interventions: HOB 30 degrees or less Nutrition Interventions: Document food/fluid/supplement intake Problem: Falls - Risk of 
Goal: *Absence of Falls Document [facility] Fall Risk and appropriate interventions in the flowsheet. Outcome: Progressing Towards Goal 
Fall Risk Interventions: 
Mobility Interventions: Patient to call before getting OOB Medication Interventions: Patient to call before getting OOB Elimination Interventions: Call light in reach, Patient to call for help with toileting needs History of Falls Interventions: Door open when patient unattended

## 2018-11-10 NOTE — PROGRESS NOTES
Progress Note Patient: Betzaida Vang               Sex: female          DOA: 11/8/2018 YOB: 1955      Age:  58 y.o.        LOS:  LOS: 2 days CHIEF COMPLAINT:  Cellulitis Subjective:  
 
Sitting up in chair No distress Objective:  
  
Visit Vitals /66 (BP 1 Location: Right arm, BP Patient Position: At rest) Pulse 90 Temp 99.9 °F (37.7 °C) Resp 16 Ht 5' 6\" (1.676 m) Wt 120.2 kg (265 lb) SpO2 91% BMI 42.77 kg/m² Physical Exam: 
Gen:  No distress, no complaint Lungs:  Clear bilaterally, no wheeze or rhonchi Heart:  Regular rate and rhythm, no murmurs or gallops Abdomen:  Soft, non-tender, normal bowel sounds Lab/Data Reviewed: 
BMP: No results found for: NA, K, CL, CO2, AGAP, GLU, BUN, CREA, GFRAA, GFRNA 
CBC:  
Lab Results Component Value Date/Time WBC 17.9 (H) 11/10/2018 03:30 AM  
 HGB 9.6 (L) 11/10/2018 03:30 AM  
 HCT 29.3 (L) 11/10/2018 03:30 AM  
  11/10/2018 03:30 AM  
 
 
 
 
Assessment/Plan Principal Problem: 
  Cellulitis of leg (11/8/2018) Active Problems: 
  Diabetes mellitus ([facility] [address] (3/28/2013) Hypertension (3/28/2013) Dyslipidemia (5/1/2013) PAD (peripheral artery disease) (HCC) () Sepsis ([facility] [address] (11/8/2018) Plan: 
Continue with antibiotics BP control BS control Anticipate likely discharge tomorrow

## 2018-11-11 VITALS
HEIGHT: 66 IN | BODY MASS INDEX: 42.59 KG/M2 | TEMPERATURE: 98.7 F | WEIGHT: 265 LBS | DIASTOLIC BLOOD PRESSURE: 71 MMHG | OXYGEN SATURATION: 95 % | HEART RATE: 86 BPM | RESPIRATION RATE: 18 BRPM | SYSTOLIC BLOOD PRESSURE: 133 MMHG

## 2018-11-11 LAB
ERYTHROCYTE [DISTWIDTH] IN BLOOD BY AUTOMATED COUNT: 14.6 % (ref 11.6–14.5)
GLUCOSE BLD STRIP.AUTO-MCNC: 146 MG/DL (ref 70–110)
GLUCOSE BLD STRIP.AUTO-MCNC: 190 MG/DL (ref 70–110)
HCT VFR BLD AUTO: 28.8 % (ref 35–45)
HGB BLD-MCNC: 9.3 G/DL (ref 12–16)
MCH RBC QN AUTO: 27.9 PG (ref 24–34)
MCHC RBC AUTO-ENTMCNC: 32.3 G/DL (ref 31–37)
MCV RBC AUTO: 86.5 FL (ref 74–97)
PLATELET # BLD AUTO: 357 K/UL (ref 135–420)
PMV BLD AUTO: 10 FL (ref 9.2–11.8)
RBC # BLD AUTO: 3.33 M/UL (ref 4.2–5.3)
WBC # BLD AUTO: 13.5 K/UL (ref 4.6–13.2)

## 2018-11-11 PROCEDURE — 74011250636 HC RX REV CODE- 250/636: Performed by: HOSPITALIST

## 2018-11-11 PROCEDURE — 36415 COLL VENOUS BLD VENIPUNCTURE: CPT

## 2018-11-11 PROCEDURE — 85027 COMPLETE CBC AUTOMATED: CPT

## 2018-11-11 PROCEDURE — 74011000258 HC RX REV CODE- 258: Performed by: HOSPITALIST

## 2018-11-11 PROCEDURE — 74011250637 HC RX REV CODE- 250/637: Performed by: HOSPITALIST

## 2018-11-11 PROCEDURE — 82962 GLUCOSE BLOOD TEST: CPT

## 2018-11-11 PROCEDURE — 74011636637 HC RX REV CODE- 636/637: Performed by: HOSPITALIST

## 2018-11-11 RX ORDER — AMOXICILLIN AND CLAVULANATE POTASSIUM 875; 125 MG/1; MG/1
1 TABLET, FILM COATED ORAL 2 TIMES DAILY
Qty: 14 TAB | Refills: 0 | Status: SHIPPED | OUTPATIENT
Start: 2018-11-11 | End: 2018-11-18

## 2018-11-11 RX ADMIN — ASPIRIN 81 MG: 81 TABLET, COATED ORAL at 09:05

## 2018-11-11 RX ADMIN — AMLODIPINE BESYLATE 5 MG: 5 TABLET ORAL at 09:05

## 2018-11-11 RX ADMIN — VANCOMYCIN HYDROCHLORIDE 1000 MG: 10 INJECTION, POWDER, LYOPHILIZED, FOR SOLUTION INTRAVENOUS at 06:00

## 2018-11-11 RX ADMIN — LOSARTAN POTASSIUM 50 MG: 50 TABLET ORAL at 09:05

## 2018-11-11 RX ADMIN — GABAPENTIN 100 MG: 100 CAPSULE ORAL at 09:05

## 2018-11-11 RX ADMIN — OXYCODONE HYDROCHLORIDE AND ACETAMINOPHEN 1 TABLET: 5; 325 TABLET ORAL at 06:17

## 2018-11-11 RX ADMIN — PIPERACILLIN SODIUM,TAZOBACTAM SODIUM 3.38 G: 3; .375 INJECTION, POWDER, FOR SOLUTION INTRAVENOUS at 07:19

## 2018-11-11 RX ADMIN — HYDROCHLOROTHIAZIDE 25 MG: 25 TABLET ORAL at 09:05

## 2018-11-11 RX ADMIN — LEVOTHYROXINE SODIUM 25 MCG: 25 TABLET ORAL at 07:19

## 2018-11-11 RX ADMIN — Medication: at 11:47

## 2018-11-11 RX ADMIN — INSULIN LISPRO 2 UNITS: 100 INJECTION, SOLUTION INTRAVENOUS; SUBCUTANEOUS at 12:17

## 2018-11-11 NOTE — PROGRESS NOTES
Problem: Pressure Injury - Risk of 
Goal: *Prevention of pressure injury Document Apolinar Scale and appropriate interventions in the flowsheet. Outcome: Progressing Towards Goal 
Pressure Injury Interventions: 
  
 
Moisture Interventions: Absorbent underpads Activity Interventions: Pressure redistribution bed/mattress(bed type) Mobility Interventions: HOB 30 degrees or less Nutrition Interventions: Document food/fluid/supplement intake Problem: Falls - Risk of 
Goal: *Absence of Falls Document Katerina Bauman Fall Risk and appropriate interventions in the flowsheet. Outcome: Progressing Towards Goal 
Fall Risk Interventions: 
Mobility Interventions: Patient to call before getting OOB Medication Interventions: Patient to call before getting OOB Elimination Interventions: Patient to call for help with toileting needs History of Falls Interventions: Door open when patient unattended

## 2018-11-11 NOTE — PROGRESS NOTES
1920 - Assumed pt care from Reba, [address] [place] [address]. Pt in bed, alert and oriented x 4. Not in any form of distress. Denies pain. Frequent use items and call bell within reach. Verbalized understanding to call for assistance. Bed locked in lowest position. Pain controlled with Percocet 5-325 mg throughout the night. 
 
0600 - Wound care completed. Dressing changed. ABD pad with gauze. 0720 - Bedside and Verbal shift change report given to ERNESTO Britton (oncoming nurse) by Esther Merchant RN (offgoing nurse). Report included the following information SBAR, Kardex, Intake/Output, MAR and Recent Results.

## 2018-11-11 NOTE — ROUTINE PROCESS
Bedside and Verbal shift change report given to David Sen RN (oncoming nurse) by Cipriano Boothe RN, BSN (offgoing nurse). Report given with SBAR, Kardex, Intake/Output, MAR and Recent Results.

## 2018-11-11 NOTE — PROGRESS NOTES
Patient received in bed awake. Patient A&Ox4, stated right leg pain 3/10; see MAR for action that was taken by night nurse this am. Right leg continues to be elevated to pillows. Doppler pedal pulse obtained bilaterally. Right leg dsg to posterior lower ext; CDI. Skin warm to touch. No distress noted. Frequently use items within reach. Bed locked in low position. Call bell within reach and Patient verbalized understanding of use for assistance and needs. 1147- Patient to be discharged home, wound care completed at this time; see wound. Patient's  viewed dsg change as this nurse performed. Patient tolerated well. 1225- Pt discharge home, accompanied by Dyan Manzo () and Quincy Gallagher CNA via of w/c to front entrance. Patient has discharge instructions, along with 1 prescription for Augmentin and belongings, including printout of Wound Care/ Dressing change to right lower leg order; wound care supplies; Iodosorb topical gel; box of Hypafix tape and dermal wound cleanser. Both Patient and  voiced understanding of discharge instructions.

## 2018-11-11 NOTE — DISCHARGE INSTRUCTIONS
DISCHARGE SUMMARY from Nurse    PATIENT INSTRUCTIONS:      What to do at Home:  * Recommended activity: as tolerated    * If you experience any of the following symptoms as listed in your teaching for Cellulitis under \"When Should You Call For Help?\", please follow up with your Primary Care Physician and/ or call 911. *  Please give a list of your current medications to your Primary Care Provider. *  Please update this list whenever your medications are discontinued, doses are      changed, or new medications (including over-the-counter products) are added. *  Please carry medication information at all times in case of emergency situations. These are general instructions for a healthy lifestyle:    No smoking/ No tobacco products/ Avoid exposure to second hand smoke  Surgeon General's Warning:  Quitting smoking now greatly reduces serious risk to your health. Obesity, smoking, and sedentary lifestyle greatly increases your risk for illness    A healthy diet, regular physical exercise & weight monitoring are important for maintaining a healthy lifestyle    You may be retaining fluid if you have a history of heart failure or if you experience any of the following symptoms:  Weight gain of 3 pounds or more overnight or 5 pounds in a week, increased swelling in our hands or feet or shortness of breath while lying flat in bed. Please call your doctor as soon as you notice any of these symptoms; do not wait until your next office visit. Recognize signs and symptoms of STROKE:    F-face looks uneven    A-arms unable to move or move unevenly    S-speech slurred or non-existent    T-time-call 911 as soon as signs and symptoms begin-DO NOT go       Back to bed or wait to see if you get better-TIME IS BRAIN. Warning Signs of HEART ATTACK     Call 911 if you have these symptoms:   Chest discomfort.  Most heart attacks involve discomfort in the center of the chest that lasts more than a few minutes, or that goes away and comes back. It can feel like uncomfortable pressure, squeezing, fullness, or pain.  Discomfort in other areas of the upper body. Symptoms can include pain or discomfort in one or both arms, the back, neck, jaw, or stomach.  Shortness of breath with or without chest discomfort.  Other signs may include breaking out in a cold sweat, nausea, or lightheadedness. Don't wait more than five minutes to call 911 - MINUTES MATTER! Fast action can save your life. Calling 911 is almost always the fastest way to get lifesaving treatment. Emergency Medical Services staff can begin treatment when they arrive -- up to an hour sooner than if someone gets to the hospital by car. Patient armband removed and shredded    The discharge information has been reviewed with the patient and spouse. The patient and spouse verbalized understanding. Discharge medications reviewed with the patient and spouse and appropriate educational materials and side effects teaching were provided.   ___________________________________________________________________________________________________________________________________

## 2018-11-11 NOTE — ROUTINE PROCESS
Care Management Interventions PCP Verified by CM: Yes 
Palliative Care Criteria Met (RRAT>21 & CHF Dx)?: No 
Mode of Transport at Discharge: Other (see comment) Transition of Care Consult (CM Consult): Discharge Planning Discharge Durable Medical Equipment: No 
Physical Therapy Consult: Yes Occupational Therapy Consult: Yes Speech Therapy Consult: No 
Current Support Network: Lives with Spouse Confirm Follow Up Transport: Family Plan discussed with Pt/Family/Caregiver: Yes Discharge Location Discharge Placement: Home

## 2018-11-12 ENCOUNTER — TELEPHONE (OUTPATIENT)
Dept: FAMILY MEDICINE CLINIC | Age: 63
End: 2018-11-12

## 2018-11-12 NOTE — TELEPHONE ENCOUNTER
Patient requesting a call back from nurse regarding her potassium levels. States she was in the ED over the weekend and was removed from her supplement.

## 2018-11-13 NOTE — TELEPHONE ENCOUNTER
Patient declined appointment for tomorrow due to no transportion. Requesting phone call back regarding potassium levels. Please advise, thank you.

## 2018-11-14 LAB
BACTERIA SPEC CULT: NORMAL
BACTERIA SPEC CULT: NORMAL
SERVICE CMNT-IMP: NORMAL
SERVICE CMNT-IMP: NORMAL

## 2018-11-14 NOTE — TELEPHONE ENCOUNTER
Spoke with patient, patient advised to keep 11/20/2018 appt. Pt verbalized understanding. This encounter will be closed.

## 2018-11-18 NOTE — DISCHARGE SUMMARY
[address]    Curtistine Skiff  MR#: [id number]  : 1955  ACCOUNT #: [de-identified]   ADMIT DATE: 2018  DISCHARGE DATE: 2018    ADMITTING DIAGNOSIS:  Cellulitis. HISTORY OF PRESENT ILLNESS:  The patient is a 58-year-old white female who presented to the emergency department with a nonhealing wound involving her right foot. She has had an ulcer for many months, but it appeared to be worsened. In the emergency room, she was found to have cellulitis, which failed outpatient antibiotic management. She also had evidence for sepsis. She was admitted for ongoing management. HOSPITAL COURSE:  The patient was begun on IV antibiotics and her cellulitis improved progressively. She is also known to have diabetes and her blood sugars are relatively well controlled. Peripheral arterial studies were done, which demonstrated some mild involvement of her peripheral vasculature. This did not warrant inpatient vascular surgery consultation, but this should be followed as an outpatient. The patient continued to improve and by 2018 she was considered ready for discharge. DISPOSITION:  She is discharged home. DISCHARGE DIAGNOSES:  1. Cellulitis, improved. 2.  Peripheral arterial disease, in need of outpatient followup with vascular surgery. 3.  Diabetes. 4.  Hypertension. 5.  Sepsis, improved. CONDITION:  Her condition is improved. ACTIVITY:  Ad cash. FOLLOWUP:  With her primary care provider in 1 week. The patient will arrange. DISCHARGE MEDICATIONS:  1.  Norvasc 5 mg by mouth daily. 2.  Augmentin 875/125 one tablet by mouth 2 times daily for 7 days. 3.  Aspirin 81 mg by mouth daily. 4.  Lasix 40 mg by mouth 2 times daily. 5.  Neurontin 100 mg by mouth 2 times daily. 6.  Hydrochlorothiazide 25 mg by mouth daily. 7.  Ibuprofen 800 mg by mouth every 8 hours as needed for pain. 8.  Synthroid 25 mcg by mouth daily.   9.  Losartan 50 mg by mouth daily. 10.  Zofran 4 mg by mouth every 8 hours as needed for nausea. 11.  Percocet 5/325 one tablet by mouth every 4 hours as needed for pain. 12.  Potassium chloride 20 mEq by mouth daily. DIET:  Her diet is regular. MD CURT Michel/[place]  D: 11/17/2018 07:40     T: 11/18/2018 08:26  JOB #: [id number]  CC: Jer Diaz MD

## 2018-11-29 ENCOUNTER — HOSPITAL ENCOUNTER (OUTPATIENT)
Dept: WOUND CARE | Age: 63
Discharge: HOME | End: 2018-11-29
Payer: COMMERCIAL

## 2018-11-29 PROCEDURE — 97597 DBRDMT OPN WND 1ST 20 CM/<: CPT

## 2018-11-29 RX ORDER — LEVOFLOXACIN 500 MG/1
500 TABLET, FILM COATED ORAL DAILY
Qty: 7 TAB | Refills: 0 | Status: SHIPPED | OUTPATIENT
Start: 2018-11-29 | End: 2018-12-11 | Stop reason: ALTCHOICE

## 2018-11-29 RX ORDER — ACETAMINOPHEN 325 MG/1
500 TABLET ORAL
COMMUNITY

## 2018-11-29 NOTE — PROGRESS NOTES
Podiatry Consultation Note    Assessment:       Patient Active Problem List   Diagnosis Code    [facility] mellitus [facility] E11.9    DJD (degenerative joint disease) of knee M17.10    Anemia D64.9    Stroke ([facility] I63.9    Hypertension I10    Murmur, cardiac R01.1    Dyslipidemia E78.5    PAD (peripheral artery disease) ([facility]) I73.9    Leg pain M79.606    Edema of left lower extremity R60.0    Varicose veins I83.90    Type 2 diabetes with nephropathy ([facility]) E11.21    Obesity, morbid ([facility]) E66.01    Ulcer of lower extremity with fat layer exposed, right ([facility]) L97.912    Cellulitis of leg L03.119    Sepsis ([facility]) A41.9       Plan:     Reviewed recent vascular testing with the patient and . Nonselective crosshatching RLE/calf. Will start [address] with Santyl ointment Q 24 hrs with DSD. Patient instructed in proper technique for dressing changes. Reviewed C&S results eRx Levaquin x 7 days. Advised patient to keep appointment with vascular on 12/10/18    Subjective:     I was asked to evaluate [facility] for RLE/calf ulceration with a vascular component. She states she was hospitalized earlier this month for cellulitis. She has completed the her antibiotic given when she was discharged from the hospital.She states she does have an appointment with the vascular provider 12/10/18. She  is a 61 y.o. female admitted on 11/29/2018 for Wound Care. Allergies   Allergen Reactions    Codeine Hives    Lisinopril Swelling    Statins-Hmg-Coa Reductase Inhibitors Unknown (comments)    Sulfur Hives       Current Outpatient Medications   Medication Sig    acetaminophen (TYLENOL) 325 mg tablet Take  by mouth every four (4) hours as needed for Pain.  levoFLOXacin (LEVAQUIN) 500 mg tablet Take 1 Tab by mouth daily.  furosemide (LASIX) 40 mg tablet Take 1 Tab by mouth two (2) times daily (with meals).  gabapentin (NEURONTIN) 100 mg capsule Take 1 Cap by mouth two (2) times a day.     hydroCHLOROthiazide (HYDRODIURIL) 25 mg tablet Take 25 mg by mouth daily.  amLODIPine (NORVASC) 5 mg tablet Take 1 Tab by mouth daily.  levothyroxine (SYNTHROID) 25 mcg tablet Take 1 Tab by mouth Daily (before breakfast).  aspirin delayed-release 81 mg tablet Take  by mouth daily.  losartan (COZAAR) 50 mg tablet Take 1 Tab by mouth daily.  potassium chloride (K-DUR, KLOR-CON) 20 mEq tablet Take 1 Tab by mouth daily.  ibuprofen (MOTRIN) 800 mg tablet Take 1 Tab by mouth every eight (8) hours as needed for Pain.  oxyCODONE-acetaminophen (PERCOCET) 5-325 mg per tablet Take 1 Tab by mouth every four (4) hours as needed for Pain. Max Daily Amount: 6 Tabs.  ondansetron (ZOFRAN ODT) 4 mg disintegrating tablet Take 1 Tab by mouth every eight (8) hours as needed for Nausea. No current facility-administered medications for this encounter.         Past Medical History:   Diagnosis Date    Anemia     Arthritis     both knee    Diabetes ([facility]     Diet controlled    HLD (hyperlipidemia)     Hypertension     [facility]AD [facility]peripheral [facility] disease) ([facility])     s/p L TP trunk athrectomy (05/2013)    Stroke ([facility]     2009     Past Surgical History:   Procedure Laterality Date    HX KNEE REPLACEMENT      left    HX TUMOR REMOVAL      soft palate    HX VASCULAR ACCESS      stent left leg 2011     Family History   Problem Relation Age of Onset    Cancer Mother     Cancer Father     Diabetes Father     Hypertension Father      Social History     Socioeconomic History    Marital status:      Spouse name: Not on file    Number of children: Not on file    Years of education: Not on file    Highest education level: Not on file   Social Needs    Financial resource strain: Not on file    Food insecurity - worry: Not on file    Food insecurity - inability: Not on file   [employer] needs - medical: Not on file   [employer] needs - non-medical: Not on file   Occupational History    Not on file   Tobacco Use  Smoking status: Never Smoker    Smokeless tobacco: Never Used   Substance and Sexual Activity    Alcohol use: No    Drug use: No    Sexual activity: Not Currently     Partners: Male     Birth control/protection: None   Other Topics Concern     Service Not Asked    Blood Transfusions Not Asked    Caffeine Concern Not Asked    Occupational Exposure Not Asked    Hobby Hazards Not Asked    Sleep Concern Not Asked    Stress Concern Not Asked    Weight Concern Not Asked    Special Diet Not Asked    Back Care Not Asked    Exercise Not Asked    Bike Helmet Not Asked   [address] Not Asked    Self-Exams Not Asked   Social History Narrative    Not on file       Physical Exam:  GENERAL: alert, cooperative, no distress, appears stated age    REVIEW OF SYSTEMS:  General: denies chronic fatigue, weight loss, fever, anemia, bruising, depression, nervousness, panic attacks  HEENT: denies ringing in ears, ear infections, dizzy spells, poor vision, glaucoma, sinus trouble, hoarseness, eye infections  GI: denies diarrhea, gas, bloating, heartburn, regurgitation, difficulty swallowing, painful swallowing, nausea, vomiting, constipation, abdominal pain, decreased appetite, blood in stools, black stools, jaundice, dark urine  Lungs: denies pneumonia, asthma, cough, SOB, hemoptysis  Heart: denies chest pain, irregular heart beat, ankle swelling, HTN  Skin: denies rashes, hives, allergic reaction  Urinary: denies UTI, kidney stones, decreased urine force and flow, urination at night, blood in urine, painful urination  Bones and Joints: denies arthritis, rheumatism, back pain, gout, osteoporosis  Neurologic: denies stroke, seizures, headaches, numbness, tingling      There were no vitals filed for this visit. OBJECTIVE:    Patient is well developed, cooperative, pleasant and in no apparent distress. Lower Extremity Exam:     VASCULAR EXAM:. Pedal pulses are palpable 1/4 DP 1/4 PT right and left foot. Skin temperature is warm to warm right and left foot. Digital capillary fill time is 5 seconds right and left foot. There is edema of the right and left foot and ankle. NEUROLOGICAL EXAM:. Sensation Intact with 5.07g monofilament wire right and left foot. Deep tendon reflexes intac and symmetrical on the right and left foot. Babinski is age appropriate bilateral    MUSCULOSKELETAL EXAM:. Muscle tone is normal.  Muscle strength of the flexor and extensor group inversion and eversion Bilateral. 5/5. DERMATOLOGICAL EXAM:. Skin is of abnormal texture and turgor with atrophic skin changes noting hair growth, nail changes (thickening), Bilateral. There is a ulcer full thickness posterior, medial and lateral RLE and calf. There is evidence of slough and hypergranulation noted over the region. No results found for this or any previous visit (from the past 24 hour(s)). Interpretation Summary     1. Moderate peripheral arterial disease in the bilateral lower extremities at rest.  2. Disease is located in the aorto-iliac and superficial femoral segments bilaterally. 3. There is evidence of small vessel disease bilaterally. Lower Extremity Arterial Findings     TOMEKA     The right resting TOMEKA is moderately decreased. The left resting TOMEKA is moderately decreased. Arterial disease noted at the level of the aorto-iliac arteries and femoral artery on the right side. Arterial disease noted at the level of the aorto-iliac arteries and femoral artery on the left side. The right common femoral artery, popliteal artery, anterior tibial artery and posterior tibial artery has monophasic waveforms. Right toe PPG is dampened. The left common femoral artery, popliteal artery, anterior tibial artery and posterior tibial artery has monophasic waveforms. Left toe PPG is dampened.    Arterial Pressure Measurements      Right Left   Brachial  mmHg       155 mmHg         Post Tibial BP 79 mmHg       77 mmHg         Ant Tibial BP 76 mmHg       81 mmHg         Ant Tibial Location Foot              TOMEKA 0.51        0.52          Toe Pressure 23 mmHg       33 mmHg         TBI 0.15        0.21                                               Procedure Staff     Technologist/Clinician: Prisca Paez  Supporting Staff: None  Performing Physician/Midlevel: None   PACS Images      Show images for ANKLE BRACHIAL INDEX   Signed     Electronically signed by Kim Perales MD on 11/9/18 at 1916 EST         Imaging: deferred        Hannah Henson [address] [place]  11/29/2018, 11:40 AM

## 2018-11-30 NOTE — WOUND CARE
Wound/Ostomy Nurse Progress Note          Patient: Alana Alba                                                                                      [facility]:[id number]    MRN: [id number]                  Situation: Consult for leg ulcer. Background: Recently hospitalized at [facility] for cellulitis of the same. Assessment: Patient's leg ulcer covers her calf down to her heel. There is slough and eschar in the wound. There is also some hypergranulation. Her  has been doing her dressings. She is uninsured, and cannot afford to pay for home health care. She will need a vascular consult per Dr. Skylar Estrada. Today her wound was cross-hatched and Santyl applied. Santyl will be ordered for her to use at home.      Recommendation:She will follow up in one week for a nurse visit and with Dr. Skylar Estrada in three weeks

## 2018-12-07 ENCOUNTER — HOSPITAL ENCOUNTER (OUTPATIENT)
Dept: WOUND CARE | Age: 63
Discharge: HOME | End: 2018-12-07
Payer: COMMERCIAL

## 2018-12-07 PROCEDURE — 74011250637 HC RX REV CODE- 250/637

## 2018-12-07 PROCEDURE — 77030011256 HC DRSG MEPILEX <16IN NO BORD MOLN -A

## 2018-12-07 PROCEDURE — 97602 WOUND(S) CARE NON-SELECTIVE: CPT

## 2018-12-07 RX ADMIN — COLLAGENASE SANTYL: 250 OINTMENT TOPICAL at 09:38

## 2018-12-07 NOTE — DISCHARGE INSTRUCTIONS
Wound Care Instructions    Patient: Charleston Sacks MRN: [id number]  SSN: [id number]     YOB: 1955  Age:63 y.o. Sex: female       Ms. Lindsey Guevara DPM recommends the following discharge instruction:    Offloading    []   Felt/Foam Offloading   [] Removable Cast Waker       []   Wedge Shoe   []  Wheelchair     []   Total Contact Cast   []  Surgical Shoe     []   Crutches        Other   Mattress:   Other:     Edema Control    []   Elevated legs as much as possible. Recommend above level of heart.     []   Layered Wraps             []   Left      []   Right      []  BILATERAL          - Type:            []   Unna Boot       []  Multi-Layer                                   []   Two Layers     []  Three Layers   []  Four Layers     []   Tubular Bandages        []   Left      []   Right     SIZE:      []   Stockings                      []   Left      []   Right     []   Compression Pump     []   Left      []   Right    ___ millimeters of mercury  ___ millimeters of mercury for ___ minutes for ___ day(s)        Other:       Nutritional Supplements    [x]  Multi-Vitamin     []  Other:       [employer]     [x]  Home Health: [address]      []  Long Term Care:      []  DME:     Consult    []   Nutrition     []   Vascular     []   Orthotist/Pedorthotist     []   Infectious Disease     []   Lymphedema Therapist   Other:     Dressings:  Another brand of generically equivalent product may be dispensed unless specifically ordered otherwise.     Home Ulcer/wound Hygiene (cleanse with)      []   Distilled Water     [x]   Normal Saline only with Santyl     []   Wound Cleanser     []   Mild antimicrobial soap and water     []   Chlorhexidine liquid soap and water     []   Other:     Apply    []   Hydrogel   []  Hypergel   []   Aquacel Ag     []   Cadexomer Iodine                     (Iodosorb)   []  Silver Alginate    []   Medihoney:     [x]   Collagenase:Santyl to base of right lower leg ulcer, cover with MeSalt   []  Calcium Alginate   []   Collagen:     []   Plain Foam   []  Non-Adherent Contact                Layer   []   Xeroform     []   Silver foam   []   Hydrocolloid        []   Acticoat   []   Adaptic:      Other/Specific Instructions:    Cover With    []   Dry Gauze and Roll Gauze     []   Foam and Roll Gauze     []   Dry Gauze     []   Bordered gauze:     []   Foam      []    Bordered         []   Nonbordered     [x]   Secure with Renate     [x]   Other  ABD and Kerlix     Jackie-Ulcer Care    []   Cream     []   Lotion     []   Ointment     []   Barrier     []   Other:     Change Dressing    [x]   Once Daily     []   Every Other Day     []   Every 3 Days       []   Every 5 Days     []   Every 7 Days     []   Do Not Change       []   2 x per week (Mon and Thurs. )     []   3 x per week (Mon, Wed, Fri. )     []   Other          Follow-Up:   [x]  Follow up   12/20/18                          []  As Needed (PRN)     []   Fide Gtz MD    []  Yon Tate DPM    [x]  Jose Peters DPM   []   Michelle Plasencia DPM      []   Nurse Visit            Please call the wound clinic ([phone]) regarding any questions or concerns.

## 2018-12-07 NOTE — WOUND CARE
Patient here for nurse visit/dressing change. Dressing is intact with strike through drainage. Spouse/cg anxious about wife's leg and performing dressing changes. Patient states she now has insurance. She and spouse/cg are open to having home care services. Orders faxed to [facility] for [facility] tomorrow or as soon as possible. Patient has an appointment with vascular on Monday and she will return for follow up appointment in the wound center December 20, 2018.  
 
 12/07/18 2547 Wound Calf Lower; Posterior;Right Date First Assessed/Time First Assessed: 11/08/18 2000   POA: Yes  Wound Type: Other  Location: Calf  Wound Description (Optional): scattered open wounds on right lower posterior leg & heel. Orientation: Lower; Posterior;Right DRESSING STATUS Removed DRESSING TYPE Other (Comment) 
(Kerlix, Telfa) Non-Pressure Injury Full thickness (subcut/muscle) Wound Length (cm) 24.5 cm Wound Width (cm) 12.5 cm Wound Depth (cm) 1.4 Wound Surface area (cm^2) 306.25 cm^2 Change in Wound Size % -13.32 Condition of Base Eschar;Pink;Slough Tissue Type Black;Pink;Yellow Tissue Type Percent Black 20 % Tissue Type Percent Pink 50 Tissue Type Percent Yellow 30 Drainage Amount  Large Wound Odor None Periwound Skin Condition Edematous Cleansing and Cleansing Agents  Dermal wound cleanser Dressing Type Applied Other (Comment) (Santyl, Mesalt, ABD, Foam around ankle, Kerlix, tubigauze)

## 2018-12-08 ENCOUNTER — HOME HEALTH ADMISSION (OUTPATIENT)
Dept: HOME HEALTH SERVICES | Facility: HOME HEALTH | Age: 63
End: 2018-12-08
Payer: COMMERCIAL

## 2018-12-09 ENCOUNTER — HOME CARE VISIT (OUTPATIENT)
Dept: SCHEDULING | Facility: HOME HEALTH | Age: 63
End: 2018-12-09
Payer: COMMERCIAL

## 2018-12-09 PROCEDURE — G0299 HHS/HOSPICE OF RN EA 15 MIN: HCPCS

## 2018-12-09 PROCEDURE — 400013 HH SOC

## 2018-12-10 ENCOUNTER — HOME CARE VISIT (OUTPATIENT)
Dept: HOME HEALTH SERVICES | Facility: HOME HEALTH | Age: 63
End: 2018-12-10
Payer: COMMERCIAL

## 2018-12-10 VITALS
OXYGEN SATURATION: 98 % | SYSTOLIC BLOOD PRESSURE: 132 MMHG | DIASTOLIC BLOOD PRESSURE: 75 MMHG | RESPIRATION RATE: 20 BRPM | TEMPERATURE: 97 F | HEART RATE: 89 BPM

## 2018-12-10 PROCEDURE — A6446 CONFORM BAND S W>=3" <5"/YD: HCPCS

## 2018-12-10 PROCEDURE — A4927 NON-STERILE GLOVES: HCPCS

## 2018-12-10 PROCEDURE — A6223 GAUZE >16<=48 NO W/SAL W/O B: HCPCS

## 2018-12-10 PROCEDURE — A6252 ABSORPT DRG >16 <=48 W/O BDR: HCPCS

## 2018-12-10 PROCEDURE — A4649 SURGICAL SUPPLIES: HCPCS

## 2018-12-11 ENCOUNTER — OFFICE VISIT (OUTPATIENT)
Dept: CARDIOLOGY CLINIC | Age: 63
End: 2018-12-11

## 2018-12-11 VITALS
OXYGEN SATURATION: 98 % | DIASTOLIC BLOOD PRESSURE: 74 MMHG | BODY MASS INDEX: 41 KG/M2 | WEIGHT: 254 LBS | HEART RATE: 102 BPM | SYSTOLIC BLOOD PRESSURE: 132 MMHG

## 2018-12-11 DIAGNOSIS — I73.9 PAD (PERIPHERAL ARTERY DISEASE) (HCC): Primary | ICD-10-CM

## 2018-12-11 DIAGNOSIS — M79.89 LEG SWELLING: ICD-10-CM

## 2018-12-11 RX ORDER — CHLORTHALIDONE 25 MG/1
25 TABLET ORAL DAILY
Qty: 30 TAB | Refills: 6 | Status: SHIPPED | OUTPATIENT
Start: 2018-12-11 | End: 2019-02-22

## 2018-12-11 NOTE — PROGRESS NOTES
Kamari Marcano is a pleasant 61 y.o. female with a history of diabetes, hypertension, peripheral artery disease, arthritis and history of stroke. Ms. Nichole Kaba is here today to establish care withme. She denies any prior history of myocardial infarction or congestive heart failure. She did have peripheral artery disease. She has chronic wounds of both lower extremities, for which she is seeing wound care clinic. She denies any resting or exertional chest pain or chest tightness. She does have chronic lower extremity swelling, for which she takes Lasix. She denies any palpitations, presyncope or syncope. She is requesting if she needs to see any vascular surgery for her lower extremity wounds. Past Medical History:   Diagnosis Date    Anemia     Arthritis     both knee    Diabetes ([facility]     Diet controlled    HLD (hyperlipidemia)     Hypertension     PAD (peripheral artery disease) ([facility])     s/p L TP trunk athrectomy (05/2013)    Stroke ([facility]     2009       Past Surgical History:   Procedure Laterality Date    HX KNEE REPLACEMENT      left    HX TUMOR REMOVAL      soft palate    HX VASCULAR ACCESS      stent left leg 2011       Current Outpatient Medications   Medication Sig    chlorthalidone (HYGROTEN) 25 mg tablet Take 1 Tab by mouth daily.  acetaminophen (TYLENOL) 325 mg tablet Take 500 mg by mouth every six (6) hours as needed for Pain.  furosemide (LASIX) 40 mg tablet Take 1 Tab by mouth two (2) times daily (with meals).  gabapentin (NEURONTIN) 100 mg capsule Take 1 Cap by mouth two (2) times a day.  potassium chloride (K-DUR, KLOR-CON) 20 mEq tablet Take 1 Tab by mouth daily.  levothyroxine (SYNTHROID) 25 mcg tablet Take 1 Tab by mouth Daily (before breakfast).  aspirin delayed-release 81 mg tablet Take  by mouth daily.  ibuprofen (MOTRIN) 800 mg tablet Take 1 Tab by mouth every eight (8) hours as needed for Pain.     losartan (COZAAR) 50 mg tablet Take 1 Tab by mouth daily.    ondansetron (ZOFRAN ODT) 4 mg disintegrating tablet Take 1 Tab by mouth every eight (8) hours as needed for Nausea. No current facility-administered medications for this visit. Allergies and Sensitivities:  Allergies   Allergen Reactions    Codeine Hives    Lisinopril Swelling    Statins-Hmg-Coa Reductase Inhibitors Unknown (comments)    Sulfur Hives     Family History:  Family History   Problem Relation Age of Onset    Cancer Mother     Cancer Father     Diabetes Father     Hypertension Father      Social History:  Social History     Tobacco Use    Smoking status: Never Smoker    Smokeless tobacco: Never Used   Substance Use Topics    Alcohol use: No    Drug use: No     She  reports that  has never smoked. she has never used smokeless tobacco.  She  reports that she does not drink alcohol. Review of Systems:  Denies skin rash,  blurring vision, photophobia, neck pain, hemoptysis, chronic cough, nausea, vomiting, hematuria, burning micturition, BRBPR, chronic headaches. Physical Exam:  BP Readings from Last 3 Encounters:   12/11/18 132/74   12/09/18 132/75   11/11/18 133/71         Pulse Readings from Last 3 Encounters:   12/11/18 (!) 102   12/09/18 89   11/11/18 86          Wt Readings from Last 3 Encounters:   12/11/18 254 lb (115.2 kg)   11/08/18 265 lb (120.2 kg)   11/05/18 267 lb (121.1 kg)     Constitutional: Oriented to person, place, and time. HENT: Head: Normocephalic and atraumatic. Neck: No JVD present. Cardiovascular: Regular rhythm. No murmur, gallop or rubs appriciated. 2/6 systolic murmur on base without radiation  Lung: Breath sounds normal. No respiratory distress. No ronchi or rales appriciated  Abdominal: No tenderness. No rebound and no guarding. Musculoskeletal: There is no edema. No cyanosis. LE edema and wound, Dressing +  No motor defect.     Review of Data:  LABS:   Lab Results   Component Value Date/Time    Sodium 138 11/09/2018 04:55 AM Potassium 4.8 11/09/2018 04:55 AM    Chloride 107 11/09/2018 04:55 AM    CO2 22 11/09/2018 04:55 AM    Glucose 117 (H) 11/09/2018 04:55 AM    BUN 12 11/09/2018 04:55 AM    Creatinine 1.09 11/09/2018 04:55 AM     No flowsheet data found. No results found for: GPT  Lab Results   Component Value Date/Time    Hemoglobin A1c 9.9 (H) 11/08/2018 04:13 PM     EKG: (05/2013) Sinus rhythm at 61 beats per minute. No ST-T changes of  ischemia. No pathologic Q-wave. LE DOPPLER (04/2013)  1. Moderate peripheral arterial disease in the bilateral lower extremities at rest.   2. Disease is located in the superficial femoral segments bilaterally. 3. The right ankle/brachial index is 0.68 and the left ankle/brachial index is 0.69.   4. There is no evidence of small vessel disease bilaterally. 5. The right digital/brachial index is 0.73 and the left digital/brachial index is 0.72. Lower extrimity angiogram (05/2013)  1. Distal abdominal aorta. No aneurysm, no obstructive disease. 2. Right lower extremity. Right common iliac, right external iliac, and right common femoral artery is patent. Right superficial femoral artery has diffuse 30-40% disease without any obstructive stenosis. Right popliteal artery has 30-40% diffuse mild to moderate disease. On the right side, the anterior tibial and posterior tibial appears to be occluded. Tibioperoneal trunk appears to have 90% tubular disease followed by peroneal artery, which has a diffuse disease. There was 1 vessel runoff with peroneal artery on the right side. Diffuse collateral seen with the distal anterior tibial and posterior tibial reconstruction at the ankle Joint. 3. Left lower extremity: Left common iliac, left external iliac and left common femoral artery are patent. Left superficial femoral artery has diffuse calcified 30%-50% moderate disease. Left tibioperoneal trunk has 90% stenosis followed by peroneal artery runoff. Left AT and left PT is occluded.  One vessel runoff with peroneal artery then reconstruction at ankle site of PT and AT.     -Status post atherectomy of 90% left tibioperoneal trunk stenosis using SS-C atherectomy catheter.   -Status post angioplasty of left trunk stenosis using 3.0 x 20 mm balloon. ECHO (05/2013)  Left ventricle: Size was normal. LVEF 60-65%. No obvious wall motion abnormalities identified in the views obtained. Wall thickness was mildly increased. There was mild concentric hypertrophy. Doppler parameters were consistent with abnormal left ventricular relaxation (grade 1 diastolic dysfunction). Right ventricle: Systolic function was normal. Estimated peak pressure was37 mmHg. IMPRESSION & PLAN:  Ms. Galina Cornelius is a 61 y.o. female with peripheral artery disease, diabetes, hypertension and hyperlipidemia. Hypertension:    BP today 132/74 mm Hg  Currently on amlodipine and lasix and losartan  ? ? Causing LE edema, would stop amlodipine and try chlorthalidone 25 mg daily. Will consider BB if needed in future. ECHO order to rule out hypertensive CV disease to make sure edema is not from it. Diabetes: This is being managed by Dr. Puneet Lozada. Last A1C is 9.9. Goal A1C is less than 7 from CV stand point. Defer to PCP    Peripheral Artery Disease:   S/p Left LE arthrectomy in 2014. Has B/L LE wound. Not seeing any vascular surgery team.  Will recommend and refer her to see Calvin Farfan from vascular team for PAD management. Hyperlipidemia: She has tolerated Livalo without any muscle side effects in past. Rest she was not able to tolerate. However she is not taking it anymore. Considering her DM, would recommend to restart. Importance of diet and exercise reinforced with patient    This plan was discussed with Mrs. Galina Cornelius     Thank you for allowing me to participate in the patient's care. Feel free to call me with any questions or concerns.

## 2018-12-11 NOTE — PATIENT INSTRUCTIONS
Dwayne Garcia will call to schedule your testing within 24-48 hours. If you do not hear from her, then please call her directly at [phone].     All testing/lab work is completed in [address] [place] [address]   at [facility] Norvasc (Amlodipine)  Start Chlorthalidone 25mg Daily       Call for results [phone] for results about 2-3 days after testing completed

## 2018-12-11 NOTE — PROGRESS NOTES
1. Have you been to the ER, urgent care clinic since your last visit? Hospitalized since your last visit? Yes    2. Have you seen or consulted any other health care providers outside of the [address] [place] [address] since your last visit? Include any pap smears or colon screening.  No

## 2018-12-12 ENCOUNTER — DOCUMENTATION ONLY (OUTPATIENT)
Dept: CARDIOLOGY CLINIC | Age: 63
End: 2018-12-12

## 2018-12-12 ENCOUNTER — HOME CARE VISIT (OUTPATIENT)
Dept: SCHEDULING | Facility: HOME HEALTH | Age: 63
End: 2018-12-12
Payer: COMMERCIAL

## 2018-12-12 VITALS
DIASTOLIC BLOOD PRESSURE: 80 MMHG | OXYGEN SATURATION: 97 % | RESPIRATION RATE: 16 BRPM | SYSTOLIC BLOOD PRESSURE: 116 MMHG | HEART RATE: 87 BPM | TEMPERATURE: 97.8 F

## 2018-12-12 PROCEDURE — G0299 HHS/HOSPICE OF RN EA 15 MIN: HCPCS

## 2018-12-12 NOTE — PROGRESS NOTES
Faxed referral and Dr. Merlinda Howard notes to Dr. Peña Soriano so that patient can be scheduled and appt with their office

## 2018-12-14 ENCOUNTER — HOME CARE VISIT (OUTPATIENT)
Dept: SCHEDULING | Facility: HOME HEALTH | Age: 63
End: 2018-12-14
Payer: COMMERCIAL

## 2018-12-14 PROCEDURE — G0299 HHS/HOSPICE OF RN EA 15 MIN: HCPCS

## 2018-12-15 VITALS
HEART RATE: 86 BPM | DIASTOLIC BLOOD PRESSURE: 75 MMHG | RESPIRATION RATE: 20 BRPM | TEMPERATURE: 97 F | OXYGEN SATURATION: 97 % | SYSTOLIC BLOOD PRESSURE: 122 MMHG

## 2018-12-16 ENCOUNTER — HOME CARE VISIT (OUTPATIENT)
Dept: SCHEDULING | Facility: HOME HEALTH | Age: 63
End: 2018-12-16
Payer: COMMERCIAL

## 2018-12-16 PROCEDURE — G0299 HHS/HOSPICE OF RN EA 15 MIN: HCPCS

## 2018-12-18 ENCOUNTER — HOME CARE VISIT (OUTPATIENT)
Dept: SCHEDULING | Facility: HOME HEALTH | Age: 63
End: 2018-12-18
Payer: COMMERCIAL

## 2018-12-18 VITALS
TEMPERATURE: 97.7 F | HEART RATE: 90 BPM | DIASTOLIC BLOOD PRESSURE: 74 MMHG | SYSTOLIC BLOOD PRESSURE: 134 MMHG | OXYGEN SATURATION: 96 % | RESPIRATION RATE: 14 BRPM

## 2018-12-18 PROCEDURE — G0299 HHS/HOSPICE OF RN EA 15 MIN: HCPCS

## 2018-12-19 VITALS
DIASTOLIC BLOOD PRESSURE: 68 MMHG | RESPIRATION RATE: 18 BRPM | TEMPERATURE: 99 F | HEART RATE: 88 BPM | SYSTOLIC BLOOD PRESSURE: 140 MMHG | OXYGEN SATURATION: 99 %

## 2018-12-19 PROCEDURE — A4216 STERILE WATER/SALINE, 10 ML: HCPCS

## 2018-12-20 ENCOUNTER — HOSPITAL ENCOUNTER (OUTPATIENT)
Dept: NON INVASIVE DIAGNOSTICS | Age: 63
Discharge: HOME | End: 2018-12-20
Attending: INTERNAL MEDICINE
Payer: COMMERCIAL

## 2018-12-20 ENCOUNTER — HOSPITAL ENCOUNTER (OUTPATIENT)
Dept: WOUND CARE | Age: 63
Discharge: HOME | End: 2018-12-20
Payer: COMMERCIAL

## 2018-12-20 VITALS
HEART RATE: 94 BPM | OXYGEN SATURATION: 97 % | RESPIRATION RATE: 16 BRPM | DIASTOLIC BLOOD PRESSURE: 81 MMHG | SYSTOLIC BLOOD PRESSURE: 162 MMHG | TEMPERATURE: 97.1 F

## 2018-12-20 VITALS
SYSTOLIC BLOOD PRESSURE: 162 MMHG | DIASTOLIC BLOOD PRESSURE: 81 MMHG | HEIGHT: 66 IN | WEIGHT: 254 LBS | BODY MASS INDEX: 40.82 KG/M2

## 2018-12-20 DIAGNOSIS — M79.604 PAIN OF RIGHT LOWER EXTREMITY: ICD-10-CM

## 2018-12-20 DIAGNOSIS — M79.89 LEG SWELLING: ICD-10-CM

## 2018-12-20 LAB
ECHO AO ROOT DIAM: 3.11 CM
ECHO AV PEAK GRADIENT: 0 MMHG
ECHO AV PEAK VELOCITY: 0 CM/S
ECHO EST RA PRESSURE: 10 MMHG
ECHO LA VOL 2C: 90.76 ML (ref 22–52)
ECHO LA VOL 4C: 70.38 ML (ref 22–52)
ECHO LA VOLUME INDEX A2C: 40.99 ML/M2 (ref 16–28)
ECHO LA VOLUME INDEX A4C: 31.79 ML/M2 (ref 16–28)
ECHO LV EDV A2C: 127.3 ML
ECHO LV EDV A4C: 76.4 ML
ECHO LV EDV BP: 101.2 ML (ref 56–104)
ECHO LV EDV INDEX A4C: 34.5 ML/M2
ECHO LV EDV INDEX BP: 45.7 ML/M2
ECHO LV EDV NDEX A2C: 57.5 ML/M2
ECHO LV EJECTION FRACTION A2C: 55 %
ECHO LV EJECTION FRACTION A4C: 72 %
ECHO LV EJECTION FRACTION BIPLANE: 61.7 % (ref 55–100)
ECHO LV ESV A2C: 57.9 ML
ECHO LV ESV A4C: 21.4 ML
ECHO LV ESV BP: 38.8 ML (ref 19–49)
ECHO LV ESV INDEX A2C: 26.2 ML/M2
ECHO LV ESV INDEX A4C: 9.7 ML/M2
ECHO LV ESV INDEX BP: 17.5 ML/M2
ECHO LV INTERNAL DIMENSION DIASTOLIC: 3.78 CM (ref 3.9–5.3)
ECHO LV INTERNAL DIMENSION SYSTOLIC: 2.78 CM
ECHO LV IVSD: 1.23 CM (ref 0.6–0.9)
ECHO LV MASS 2D: 172.7 G (ref 67–162)
ECHO LV MASS INDEX 2D: 78 G/M2 (ref 43–95)
ECHO LV POSTERIOR WALL DIASTOLIC: 1.15 CM (ref 0.6–0.9)
ECHO LVOT DIAM: 2.05 CM
ECHO LVOT PEAK GRADIENT: 6.5 MMHG
ECHO LVOT PEAK VELOCITY: 127.53 CM/S
ECHO MV A VELOCITY: 115.25 CM/S
ECHO MV AREA PHT: 5.6 CM2
ECHO MV E DECELERATION TIME (DT): 134.9 MS
ECHO MV E VELOCITY: 0.68 CM/S
ECHO MV E/A RATIO: 0.01
ECHO MV PRESSURE HALF TIME (PHT): 39.1 MS
ECHO PULMONARY ARTERY SYSTOLIC PRESSURE (PASP): 20 MMHG
ECHO RIGHT VENTRICULAR SYSTOLIC PRESSURE (RVSP): 12.3 MMHG
ECHO TV REGURGITANT MAX VELOCITY: -76.24 CM/S
ECHO TV REGURGITANT PEAK GRADIENT: 2.3 MMHG

## 2018-12-20 PROCEDURE — 87186 SC STD MICRODIL/AGAR DIL: CPT

## 2018-12-20 PROCEDURE — 93306 TTE W/DOPPLER COMPLETE: CPT

## 2018-12-20 PROCEDURE — 74011000250 HC RX REV CODE- 250

## 2018-12-20 PROCEDURE — 87077 CULTURE AEROBIC IDENTIFY: CPT

## 2018-12-20 PROCEDURE — 97597 DBRDMT OPN WND 1ST 20 CM/<: CPT

## 2018-12-20 PROCEDURE — 74011250637 HC RX REV CODE- 250/637

## 2018-12-20 PROCEDURE — 87070 CULTURE OTHR SPECIMN AEROBIC: CPT

## 2018-12-20 RX ORDER — ACETAMINOPHEN 325 MG/1
TABLET ORAL
Status: COMPLETED
Start: 2018-12-20 | End: 2018-12-20

## 2018-12-20 RX ORDER — LEVOFLOXACIN 500 MG/1
500 TABLET, FILM COATED ORAL DAILY
Qty: 10 TAB | Refills: 0 | Status: SHIPPED | OUTPATIENT
Start: 2018-12-20 | End: 2019-01-03

## 2018-12-20 RX ORDER — IBUPROFEN 800 MG/1
TABLET ORAL
Qty: 30 TAB | Refills: 1 | Status: SHIPPED | OUTPATIENT
Start: 2018-12-20 | End: 2019-02-22

## 2018-12-20 RX ORDER — LIDOCAINE HYDROCHLORIDE 40 MG/ML
SOLUTION TOPICAL
Status: COMPLETED
Start: 2018-12-20 | End: 2018-12-20

## 2018-12-20 RX ADMIN — COLLAGENASE SANTYL: 250 OINTMENT TOPICAL at 10:04

## 2018-12-20 RX ADMIN — ACETAMINOPHEN 650 MG: 325 TABLET, FILM COATED ORAL at 10:41

## 2018-12-20 RX ADMIN — LIDOCAINE HYDROCHLORIDE: 40 SOLUTION TOPICAL at 10:05

## 2018-12-20 NOTE — DISCHARGE INSTRUCTIONS
Wound Care Instructions    Patient: Nakul Saldivar MRN: [id number]  SSN: [id number]     YOB: 1955  Age:63 y.o. Sex: female       Ms. Ezequiel Osman, Dr. Tasha Hernandez recommends the following discharge instruction:    Offloading    []   Felt/Foam Offloading   [] Removable Cast Deatra Fix       []   Wedge Shoe   []  Wheelchair     []   Total Contact Cast   []  Surgical Shoe     []   Crutches        Other   Mattress:   Other:     Edema Control    []   Elevated legs as much as possible. Recommend above level of heart.     []   Layered Wraps             []   Left      []   Right      []  BILATERAL          - Type:            []   Unna Boot       []  Multi-Layer                                   []   Two Layers     []  Three Layers   []  Four Layers     []   Tubular Bandages        []   Left      []   Right     SIZE:      []   Stockings                      []   Left      []   Right     []   Compression Pump     []   Left      []   Right    ___ millimeters of mercury  ___ millimeters of mercury for ___ minutes for ___ day(s)        Other:       Nutritional Supplements    []  Multi-Vitamin     []  Other:       [employer] One     [x]  Home Health: [facility]     []  Long Term Care:      []  DME:     Consult    []   Nutrition     []   Vascular     []   Orthotist/Pedorthotist     []   Infectious Disease     []   Lymphedema Therapist   Other:     Dressings:  Another brand of generically equivalent product may be dispensed unless specifically ordered otherwise. Home Ulcer/wound Hygiene (cleanse with)      []   Distilled Water     [x]  Use Normal Saline ONLY with Santyl.  Do not use Dermal wound cleanser     []   Wound Cleanser     []   Mild antimicrobial soap and water     []   Chlorhexidine liquid soap and water     []   Other:     Apply    []   Hydrogel   []  Hypergel   []   Aquacel Ag     []   Cadexomer Iodine                     (Iodosorb)   []  Silver Alginate    []   Medihoney:     [x]   Collagenase:Santyl right posterior calf and right lateral heel   []  Calcium Alginate   []   Collagen:     []   Plain Foam   []  Non-Adherent Contact                Layer   []   Xeroform     []   Silver foam   []   Hydrocolloid        []   Acticoat   []   Adaptic:      Other/Specific Instructions:    Cover With    [x]   ABD's Dry Gauze and Roll Gauze     []   Foam and Roll Gauze     []   Dry Gauze     []   Bordered gauze:     []   Foam      []    Bordered         []   Nonbordered     [x]   Secure with Tape     []   Other       Jackie-Ulcer Care    []   Cream     []   Lotion     []   Ointment     []   Barrier     []   Other:     Change Dressing    [x]   Once Daily     []   Every Other Day     []   Every 3 Days       []   Every 5 Days     []   Every 7 Days     []   Do Not Change       []   2 x per week (Mon and Thurs. )     [x]   3 x per week (Mon, Wed, Fri. )     []   Other          Follow-Up:   [x]  Follow up in two weeks                             []  As Needed (PRN)     []   Maria Elena Bach MD    []  Heather Villafana DPM    [x]  Dayana Durham DPM   []   Demetris Garzon DPM      []   Nurse Visit            Please call the wound clinic ([phone]) regarding any questions or concerns.

## 2018-12-20 NOTE — PROGRESS NOTES
Progress and Debridement Note    Patient: Clarice Leger MRN: [id number]  SSN: [id number]    YOB: 1955  Age: 61 y.o. Sex: female      Assessment:     Active Problems:    PAD (peripheral artery disease) ([facility]) ()      Type 2 diabetes with nephropathy ([facility] (10/11/2018)      Ulcer of lower extremity with fat layer exposed, right ([facility] (10/11/2018)      Cellulitis of leg (11/8/2018)        Plan:      Selective excisional debridement with cross hatching right posterior calf and heel. Will continue with Santyl ointment Q 24 hrs. C&S taken today after NS flush. will empirically place patient on eRx Levaquin 500 mg Q day x 10 days. Advised patient to keep scheduled appointment with vascular. Pending vascular intervention,patient may benefit from surgical debridement with versajet along with graft application Right         Subjective:     Mrs Noelle Leroy present for chronic ulcer RLE/nick with a new ulcer right heel. She states she does have any appointment with vascular the first week of January. She has been changing the dressing as instructed    Objective:     Patient Vitals for the past 24 hrs:   Temp Pulse Resp BP SpO2   12/20/18 0914 97.1 °F (36.2 °C) 94 16 162/81 97 %       Physical Exam:     General Exam  alert, cooperative, no distress, appears stated age    REVIEW OF SYSTEMS:  No changes        VASCULAR EXAM:. Pedal pulses are palpable 1/4 DP 1/4 PT right and left foot. Skin temperature is warm to warm right and left foot. Digital capillary fill time is 5 seconds right and left foot. There is edema of the right and left foot and ankle.      NEUROLOGICAL EXAM:. Sensation Intact with 5.07g monofilament wire right and left foot. Deep tendon reflexes intac and symmetrical on the right and left foot.  Babinski is age appropriate bilateral     MUSCULOSKELETAL EXAM:. Muscle tone is normal.  Muscle strength of the flexor and extensor group inversion and eversion Bilateral. 5/5.      DERMATOLOGICAL EXAM:. Skin is of abnormal texture and turgor with atrophic skin changes noting hair growth, nail changes (thickening), Bilateral. There is a ulcer full thickness posterior, medial and lateral RLE and calf. There is evidence of improvement in the slough and hypergranulation noted over the region post calf. New lesion noted over the posterior lateral right heel with pain upon palpation. Mild erythema noted, no asc lymphagitis             Wound Calf Lower; Posterior;Right (Active)   DRESSING STATUS Removed 12/20/2018  9:34 AM   DRESSING TYPE Other (Comment) 12/20/2018  9:34 AM   Non-Pressure Injury Full thickness (subcut/muscle) 12/20/2018  9:34 AM   Wound Length (cm) 16 cm 12/20/2018  9:34 AM   Wound Width (cm) 12.9 cm 12/20/2018  9:34 AM   Wound Depth (cm) 1.4 12/20/2018  9:34 AM   Wound Surface area (cm^2) 206.4 cm^2 12/20/2018  9:34 AM   Change in Wound Size % 23.63 12/20/2018  9:34 AM   Condition of [facility] 12/20/2018  9:34 AM   Tissue Type Yellow;Black; Pink 12/20/2018  9:34 AM   Tissue Type Percent Black 10 % 12/20/2018  9:34 AM   Tissue Type Percent Pink 20 12/20/2018  9:34 AM   Tissue Type Percent Yellow 70 12/20/2018  9:34 AM   Drainage Amount  Large 12/20/2018  9:34 AM   Drainage Color Green;Cloudy 12/20/2018  9:34 AM   Wound Odor None 12/20/2018  9:34 AM   Periwound Skin Condition Edematous 12/20/2018  9:34 AM   Cleansing and Cleansing Agents  Dermal wound cleanser 12/20/2018  9:34 AM   Dressing Type Applied Other (Comment) 12/7/2018  9:21 AM   Wound Procedure Type Remove [address] 11/29/2018 10:19 AM   Procedure Time Out 1145 11/29/2018 10:19 AM   Consent Obtained  Yes 11/29/2018 10:19 AM   Procedure Bleeding No 11/29/2018 10:19 AM   Procedure Pain Scale Numeric 5/10 11/29/2018 10:19 AM   Procedure Tolerated Well 11/29/2018 10:19 AM   Number of days: 42       Wound Heel Right;Lateral (Active)   DRESSING TYPE Open to air 12/20/2018  9:34 AM   Non-Pressure Injury Full thickness (subcut/muscle) 12/20/2018 9:34 AM   Wound Length (cm) 1.5 cm 12/20/2018  9:34 AM   Wound Width (cm) 0.8 cm 12/20/2018  9:34 AM   Wound Depth (cm) 0.4 12/20/2018  9:34 AM   Wound Surface area (cm^2) 1.2 cm^2 12/20/2018  9:34 AM   Condition of Base [facility] 12/20/2018  9:34 AM   Tissue Type Yellow 12/20/2018  9:34 AM   Tissue Type Percent Yellow 100 12/20/2018  9:34 AM   Drainage Amount  Small  12/20/2018  9:34 AM   Drainage Color Cloudy;Green 12/20/2018  9:34 AM   Number of days: 21       Wound Ankle Right;Posterior (Active)   DRESSING STATUS Removed 12/20/2018  9:34 AM   DRESSING TYPE Other (Comment) 12/20/2018  9:34 AM   Non-Pressure Injury Full thickness (subcut/muscle) 12/20/2018  9:34 AM   Wound Length (cm) 3.2 cm 12/20/2018  9:34 AM   Wound Width (cm) 5.6 cm 12/20/2018  9:34 AM   Wound Depth (cm) 0.2 12/20/2018  9:34 AM   Wound Surface area (cm^2) 17.92 cm^2 12/20/2018  9:34 AM   Condition of Base Slough;Powderly 12/20/2018  9:34 AM   Condition of Edges Open 12/20/2018  9:34 AM   Tissue Type Yellow;Pink 12/20/2018  9:34 AM   Tissue Type Percent Pink 40 12/20/2018  9:34 AM   Tissue Type Percent Yellow 60 12/20/2018  9:34 AM   Drainage Amount  Moderate 12/20/2018  9:34 AM   Drainage Color Serosanguinous 12/20/2018  9:34 AM   Wound Odor None 12/20/2018  9:34 AM   Periwound Skin Condition Edematous 12/20/2018  9:34 AM   Cleansing and Cleansing Agents  Dermal wound cleanser 12/20/2018  9:34 AM   Number of days: 21       Wound Leg Lateral;Left (Removed)   DRESSING TYPE Open to air 9/23/2013 11:28 AM   Non-Pressure Injury Full thickness (subcut/muscle) 6/24/2013  2:57 PM   Condition of Base Eschar 6/24/2013  2:57 PM   Condition of Edges Open 6/24/2013  2:57 PM   Epithelialization (%) 100 9/23/2013 11:28 AM   Tissue Type Yellow 6/24/2013  2:57 PM   Drainage Amount  Small  6/24/2013  2:57 PM   Drainage Color Serous 6/24/2013  2:57 PM   Wound Odor None 6/24/2013  2:57 PM   Cleansing and Cleansing Agents  Dermal wound cleanser 6/24/2013  2:57 PM Dressing Type Applied Gauze;Gauze wrap (bella); Alginate 6/24/2013  3:30 PM   Procedure Tolerated Well 6/24/2013  3:30 PM   Number of days: 91       Wound Heel Left (Removed)   DRESSING STATUS Removed 8/5/2013 11:13 AM   DRESSING TYPE Gauze wrap (bella);Gauze; Compression dressing 8/5/2013 11:13 AM   Non-Pressure Injury Partial thickness (epider/derm) 8/5/2013 11:13 AM   Condition of Base Pink;Slough 8/5/2013 11:13 AM   Condition of Edges Closed 8/5/2013 11:13 AM   Epithelialization (%) 0 6/24/2013  2:57 PM   Tissue Type Yellow 6/24/2013  2:57 PM   Tissue Type Percent Pink 50 7/22/2013 11:06 AM   Tissue Type Percent Yellow 99 8/5/2013 11:13 AM   Drainage Amount  None 8/5/2013 11:13 AM   Drainage Color Serosanguinous 7/22/2013 11:06 AM   Wound Odor None 8/5/2013 11:13 AM   Periwound Skin Condition Intact 8/5/2013 11:13 AM   Cleansing and Cleansing Agents  Dermal wound cleanser 8/5/2013 11:13 AM   Dressing Type Applied Gauze;Gauze wrap (bella) 8/5/2013 11:51 AM   Procedure Tolerated Well 8/5/2013 11:51 AM   Number of days: 91       Wound Leg Left (Removed)   DRESSING STATUS Removed 9/9/2013  2:54 PM   DRESSING TYPE Open to air 9/23/2013 11:22 AM   Non-Pressure Injury Partial thickness (epider/derm) 9/9/2013  2:54 PM   Condition of Base Epithelializing 9/9/2013  2:54 PM   Condition of Edges Open 9/9/2013  2:54 PM   Epithelialization (%) 100 9/23/2013 11:22 AM   Tissue Type Pink;Yellow 8/19/2013 11:34 AM   Tissue Type Percent Pink 40 9/9/2013  2:54 PM   Tissue Type Percent Red 40 8/5/2013 11:13 AM   Tissue Type Percent Yellow 15 8/19/2013 11:34 AM   Drainage Amount  None 9/23/2013 11:22 AM   Drainage Color Serous 9/9/2013  2:54 PM   Wound Odor None 9/23/2013 11:22 AM   Periwound Skin Condition Intact 9/23/2013 11:22 AM   Cleansing and Cleansing Agents  Dermal wound cleanser 9/9/2013  2:54 PM   Dressing Type Applied Open to air 9/23/2013 11:22 AM   Procedure Tolerated Well 9/23/2013 11:22 AM   Number of days: 89 Lab/Data Review:  No results found for this or any previous visit (from the past 24 hour(s)). Nakul Saldivar   ANKLE BRACHIAL INDEX   Order# [id number]   Reading physician: Madhuri Payton MD Ordering physician: Earl Mg MD Study date: 18   Patient Information     Patient Name  Nakul Saldivar MRN  [id number] Sex  Female     1955 (61 y.o.)   Vascular History     ANKLE BRACHIAL INDEX (Order #[id number]) on 18   Reason For Exam   Priority: STAT   hx pad stent in L leg, now with poor healing of ulcer on R leg   Procedure Technique     Physiologic testing was performed using continuous wave Doppler and segmental pressures. Vitals     Weight Height BSA (calculated - sq m) BP         Interpretation Summary     1. Moderate peripheral arterial disease in the bilateral lower extremities at rest.  2. Disease is located in the aorto-iliac and superficial femoral segments bilaterally. 3. There is evidence of small vessel disease bilaterally. Lower Extremity Arterial Findings     TOMEKA     The right resting TOMEKA is moderately decreased. The left resting TOMEKA is moderately decreased. Arterial disease noted at the level of the aorto-iliac arteries and femoral artery on the right side. Arterial disease noted at the level of the aorto-iliac arteries and femoral artery on the left side. The right common femoral artery, popliteal artery, anterior tibial artery and posterior tibial artery has monophasic waveforms. Right toe PPG is dampened. The left common femoral artery, popliteal artery, anterior tibial artery and posterior tibial artery has monophasic waveforms. Left toe PPG is dampened.    Arterial Pressure Measurements      Right Left   Brachial  mmHg       155 mmHg         Post Tibial BP 79 mmHg       77 mmHg         Ant Tibial BP 76 mmHg       81 mmHg         Ant Tibial Location Foot              TOMEKA 0.51        0.52          Toe Pressure 23 mmHg       33 mmHg         TBI 0.15 0.21                                               Procedure Staff     Technologist/Clinician: Aicha Ramos  Supporting Staff: None  Performing Physician/Midlevel: None   PACS Images      Show images for ANKLE BRACHIAL INDEX   Signed     Electronically signed by Mercy Moore MD on 11/9/18 at 1916 EST   Printable Results Report     Open Printable Results Report              PROCEDURE    Selective sharp instrument excisional debridement of slough and devitilized tissue    After the benefits/risks/SE were discussed, the patient agreed to proceed. Time out was done:   * Patient was identified by name and date of birth   * Agreement on procedure being performed was verified   * Procedure site verified and marked as necessary   * Patient was positioned for comfort   * Consent was signed and verified. Site: Entire posterior calf and heel right    Instruments used:    []  Dermal curette  [x] Blade        [x] #15  [] #10  [] Forceps  [] Tissue nippers  [] sterile scissors  [] Other     Anesthesia:    [x]  EMLA 2.5% cream: applied to wound beds for approximately 15minutes. []   Lidocaine 2% Topical Gel      []  Lidocaine injectable 1% with epinephrine 1:100,000    []  Lidocaine injectable 1% without epinephrine    []  Other:     []  None         [] patient is insensate due to neuropathy         [] patient declines        [] allergy to anesthetic        [] tissue for debridement is either superficial, loosely adherent and/or necrotic and denervated     After satisfactory anesthesia achieved, the wound/s was/were sharply debrided necrotic, devitalized and granulation tissue down to the sub Q layer,  Along with cross hatching with still noted necrotic tissue    Post debridement measurement was 16_x_12.9_x1.4_cm calf     Post debridement measurement was 3.2_x5.6_x0.2_cm ankle     Post debridement measurement was 1.5_x0.8_x0.4cm . Bleeding: <5mL Resolved with light focal pressure.      Wounds were cleaned and irrigated with saline. Wound care applied:   []   Hydrogell   []  Hypergel   []   Hydrofiber/Aquacel      []   Cadexomer Iodine (Iodosorb)   []  Silver Alginate    []   Medihoney:    [x]   Collagenase:Santyl   []  Calcium Alginate   []   Collagen:    []   Foam   []  Non-Adherent Contact Layer   []   Xeroform    []   Adaptec:   []   Hydrocolloid   []   Transparent Film    []  Antibiotic ointment/cream     []   Other (see below)     Other:     []   Dry Gauze and Roll Gauze    [x]   Foam and Roll Gauze    []   Dry Gauze    []   Bordered gauze:     []   Secure with Tape    []   Bordered foam       []   Other      []   Compression Wrap:          []   Unna Boot    []Multi-Layer    []Tubular Bandages     . Jackie-Ulcer Care    []   Cream     []   Lotion     []   Ointment     []   Barrier     []   Other:       The patient tolerated the procedure well with no complications. The patient left the exam room in satisfactory and stable condition.      Signed By: Reginald Art DPM     December 20, 2018 10:44 AM

## 2018-12-20 NOTE — WOUND CARE
Wound/Ostomy Nurse Progress Note          Patient: Chris Gomez                                                                                      :1955    MRN: [id number]            Situation: Wound care follow up with Dr. Cayetano Hamlin    Background: Slow healing posterior leg ulcer, now with an open ulcer on the right lateral heel. Home health has been coming to the patient's home twice weekly, and the patient's  has been doing daily dressings in between times with Santyl and gauze. Assessment: Her wound has improved since last visit and it is measuring smaller, but it is now draining large amount of green cloudy drainage. The base is covered with yellow and black slough, with some pink areas of granulation. Lidocaine 4% solution was applied as a local anesthetic and the wound was cross-hatched by Dr. Cayetano Hamlin. Santyl was applied. The wound was covered with ABD's and roll gauze. Recommendation: Continue with Santyl. She has an appointment with vascular in the first week of January. Her most recent TOMEKA's were 0.52 and 0.51, respectively. She will see Dr. Cayetano Hamlin in two weeks. Orders sent to [facility].

## 2018-12-24 ENCOUNTER — HOME CARE VISIT (OUTPATIENT)
Dept: SCHEDULING | Facility: HOME HEALTH | Age: 63
End: 2018-12-24
Payer: COMMERCIAL

## 2018-12-24 PROCEDURE — G0299 HHS/HOSPICE OF RN EA 15 MIN: HCPCS

## 2018-12-25 LAB
BACTERIA SPEC CULT: ABNORMAL
GRAM STN SPEC: ABNORMAL
SERVICE CMNT-IMP: ABNORMAL

## 2018-12-26 VITALS
OXYGEN SATURATION: 96 % | RESPIRATION RATE: 18 BRPM | DIASTOLIC BLOOD PRESSURE: 78 MMHG | SYSTOLIC BLOOD PRESSURE: 130 MMHG | HEART RATE: 83 BPM | TEMPERATURE: 98.7 F

## 2018-12-28 ENCOUNTER — HOME CARE VISIT (OUTPATIENT)
Dept: SCHEDULING | Facility: HOME HEALTH | Age: 63
End: 2018-12-28
Payer: COMMERCIAL

## 2018-12-28 PROCEDURE — A6223 GAUZE >16<=48 NO W/SAL W/O B: HCPCS

## 2018-12-28 PROCEDURE — G0299 HHS/HOSPICE OF RN EA 15 MIN: HCPCS

## 2018-12-28 PROCEDURE — A4450 NON-WATERPROOF TAPE: HCPCS

## 2019-01-01 ENCOUNTER — HOME CARE VISIT (OUTPATIENT)
Dept: SCHEDULING | Facility: HOME HEALTH | Age: 64
End: 2019-01-01
Payer: COMMERCIAL

## 2019-01-01 VITALS
RESPIRATION RATE: 16 BRPM | DIASTOLIC BLOOD PRESSURE: 74 MMHG | OXYGEN SATURATION: 97 % | HEART RATE: 80 BPM | SYSTOLIC BLOOD PRESSURE: 120 MMHG | TEMPERATURE: 97.6 F

## 2019-01-01 PROCEDURE — G0299 HHS/HOSPICE OF RN EA 15 MIN: HCPCS

## 2019-01-02 ENCOUNTER — OFFICE VISIT (OUTPATIENT)
Dept: FAMILY MEDICINE CLINIC | Age: 64
End: 2019-01-02

## 2019-01-02 ENCOUNTER — TELEPHONE (OUTPATIENT)
Dept: FAMILY MEDICINE CLINIC | Age: 64
End: 2019-01-02

## 2019-01-02 VITALS
BODY MASS INDEX: 39.37 KG/M2 | OXYGEN SATURATION: 99 % | WEIGHT: 245 LBS | DIASTOLIC BLOOD PRESSURE: 61 MMHG | HEIGHT: 66 IN | HEART RATE: 82 BPM | SYSTOLIC BLOOD PRESSURE: 131 MMHG | RESPIRATION RATE: 18 BRPM | TEMPERATURE: 97.7 F

## 2019-01-02 DIAGNOSIS — T14.8XXA WOUND INFECTION: Primary | ICD-10-CM

## 2019-01-02 DIAGNOSIS — L08.9 WOUND INFECTION: ICD-10-CM

## 2019-01-02 DIAGNOSIS — T14.8XXA WOUND INFECTION: ICD-10-CM

## 2019-01-02 DIAGNOSIS — R53.1 WEAKNESS: ICD-10-CM

## 2019-01-02 DIAGNOSIS — L08.9 WOUND INFECTION: Primary | ICD-10-CM

## 2019-01-02 DIAGNOSIS — E11.9 DM TYPE 2, GOAL HBA1C < 7% (HCC): ICD-10-CM

## 2019-01-02 RX ORDER — VANCOMYCIN HYDROCHLORIDE 250 MG/1
250 CAPSULE ORAL 4 TIMES DAILY
Qty: 40 CAP | Refills: 0 | Status: SHIPPED | OUTPATIENT
Start: 2019-01-02 | End: 2019-01-03

## 2019-01-02 RX ORDER — METRONIDAZOLE 500 MG/1
500 TABLET ORAL 3 TIMES DAILY
Qty: 30 TAB | Refills: 0 | Status: SHIPPED | OUTPATIENT
Start: 2019-01-02 | End: 2019-01-12

## 2019-01-02 RX ORDER — GLYBURIDE-METFORMIN HYDROCHLORIDE 2.5; 5 MG/1; MG/1
1 TABLET ORAL
Qty: 60 TAB | Refills: 3 | Status: SHIPPED | OUTPATIENT
Start: 2019-01-02 | End: 2019-01-08 | Stop reason: ALTCHOICE

## 2019-01-02 NOTE — PROGRESS NOTES
1. Have you been to the ER, urgent care clinic since your last visit? Hospitalized since your last visit? No    2. Have you seen or consulted any other health care providers outside of the [address] [place] [address] since your last visit? Include any pap smears or colon screening.  No

## 2019-01-02 NOTE — TELEPHONE ENCOUNTER
Wound specimen given to [facility], states he will bring it over to [facility] [address] and specimen will be picked up by Elisabeth around 6pm.

## 2019-01-03 ENCOUNTER — TELEPHONE (OUTPATIENT)
Dept: FAMILY MEDICINE CLINIC | Age: 64
End: 2019-01-03

## 2019-01-03 ENCOUNTER — HOSPITAL ENCOUNTER (OUTPATIENT)
Dept: WOUND CARE | Age: 64
Discharge: HOME | End: 2019-01-03
Payer: COMMERCIAL

## 2019-01-03 VITALS
HEART RATE: 81 BPM | TEMPERATURE: 96.8 F | RESPIRATION RATE: 16 BRPM | OXYGEN SATURATION: 95 % | DIASTOLIC BLOOD PRESSURE: 66 MMHG | SYSTOLIC BLOOD PRESSURE: 119 MMHG

## 2019-01-03 LAB — RESULT: NORMAL

## 2019-01-03 PROCEDURE — 74011250637 HC RX REV CODE- 250/637

## 2019-01-03 PROCEDURE — 74011000250 HC RX REV CODE- 250

## 2019-01-03 PROCEDURE — 11042 DBRDMT SUBQ TIS 1ST 20SQCM/<: CPT

## 2019-01-03 RX ORDER — DOXYCYCLINE 100 MG/1
100 CAPSULE ORAL 2 TIMES DAILY
Qty: 20 CAP | Refills: 0 | Status: SHIPPED | OUTPATIENT
Start: 2019-01-03 | End: 2019-02-22

## 2019-01-03 RX ORDER — LIDOCAINE AND PRILOCAINE 25; 25 MG/G; MG/G
CREAM TOPICAL
Status: COMPLETED
Start: 2019-01-03 | End: 2019-01-03

## 2019-01-03 RX ADMIN — COLLAGENASE SANTYL: 250 OINTMENT TOPICAL at 15:42

## 2019-01-03 RX ADMIN — LIDOCAINE AND PRILOCAINE 15 G: 25; 25 CREAM TOPICAL at 15:12

## 2019-01-03 NOTE — DISCHARGE INSTRUCTIONS
Wound Care Instructions    Patient: Debo Barnes MRN: [id number]  SSN: [id number]     YOB: 1955  Age:63 y.o. Sex: female       Ms. Harris VALDEZ Mirza recommends the following discharge instruction:    Offloading    []   Felt/Foam Offloading   [] Removable Cast Waker       []   Wedge Shoe   []  Wheelchair     []   Total Contact Cast   []  Surgical Shoe     []   Crutches        Other   Mattress:   Other:     Edema Control    []   Elevated legs as much as possible. Recommend above level of heart.     []   Layered Wraps             []   Left      []   Right      []  BILATERAL          - Type:            []   Unna Boot       []  Multi-Layer                                   []   Two Layers     []  Three Layers   []  Four Layers     []   Tubular Bandages        []   Left      []   Right     SIZE:      []   Stockings                      []   Left      []   Right     []   Compression Pump     []   Left      []   Right    ___ millimeters of mercury  ___ millimeters of mercury for ___ minutes for ___ day(s)        Other:       Nutritional Supplements    [x]  Multi-Vitamin     []  Other:       [employer] One     [x]  Home Health: [facility]     []  Long Term Care:      []  DME:     Consult    []   Nutrition     [x]   Vascular: Patient has apt. 1/4/19     []   Orthotist/Pedorthotist     []   Infectious Disease     []   Lymphedema Therapist   Other: Please add Physical Therapist to evaluate and treat patient in the home. Dressings:  Another brand of generically equivalent product may be dispensed unless specifically ordered otherwise.     Home Ulcer/wound Hygiene (cleanse with)      []   Distilled Water     [x]   Normal Saline only to be used with Normal Saline     []   Wound Cleanser     []   Mild antimicrobial soap and water     []   Chlorhexidine liquid soap and water     []   Other:     Apply    []   Hydrogel   []  Hypergel   []   Aquacel Ag     []   Cadexomer Iodine                     (Iodosorb)   [] Silver Alginate    []   Medihoney:     [x]   Collagenase:Santyl to left lower leg ulcers covered with Cutimed Sorbact (okay to substitute Adaptic if not available)   []  Calcium Alginate   []   Collagen:     []   Plain Foam   []  Non-Adherent Contact                Layer   []   Xeroform     []   Silver foam   []   Hydrocolloid        []   Acticoat   []   Adaptic:      Other/Specific Instructions:    Cover With    [x]   ABD and Roll Gauze     []   Foam and Roll Gauze     []   Dry Gauze     []   Bordered gauze:     []   Foam      []    Bordered         []   Nonbordered     []   Secure with Tape     []   Other       Jackie-Ulcer Care    []   Cream     []   Lotion     []   Ointment     []   Barrier     []   Other:     Change Dressing    []   Once Daily     [x]   Every Other Day     []   Every 3 Days       []   Every 5 Days     []   Every 7 Days     []   Do Not Change       []   2 x per week (Mon and Thurs. )     []   3 x per week (Mon, Wed, Fri. )     []   Other          Follow-Up:   [x]  Follow up  1/17/19                            []  As Needed (PRN)     []   Pita Gomez MD    []  Eden Willams DPM    [x]  Greta STILESM   []   Flores Elam DPM      []   Nurse Visit            Please call the wound clinic ([phone]) regarding any questions or concerns.

## 2019-01-03 NOTE — WOUND CARE
Patient here for follow up appointment for left lower leg ulcers. Patient's spouse/cg is doing dressing changes when home care does not come. Wounds have improved with application of Santyl ointment. She saw her PCP Dr. Suyapa Lacey this week who placed her on antibiotics. She has appointment with vascular physician tomorrow. Wound care will continue with Santyl ointment (insurance will only pay for every other day), covered with  Cutimed Sorbact (or adaptic if Cutimed not available), ABD, Kerlix, and tape. She will return to clinic in 2 weeks. Orders faxed to [facility]. PT ordered to evaluate and treat per  Dr. Zoe Severe. 01/03/19 1605 Wound Heel Right;Lateral  
Date First Assessed: 11/29/18   POA: Yes  Wound Type: Vascular  Location: Heel  Orientation: Right;Lateral  
DRESSING STATUS Removed DRESSING TYPE Other (Comment) 
(Kerlix, gauze (Santyl)) Non-Pressure Injury Full thickness (subcut/muscle) Wound Length (cm) 1.5 cm Wound Width (cm) 1.5 cm Wound Depth (cm) 0.2 Wound Surface area (cm^2) 2.25 cm^2 Change in Wound Size % -87.5 Condition of Base Pink;Slough Tissue Type Pink;Yellow Tissue Type Percent Pink 50 Tissue Type Percent Yellow 50 Drainage Amount  Small Drainage Color Serous Wound Odor None Periwound Skin Condition Edematous Cleansing and Cleansing Agents  Normal saline Dressing Type Applied Other (Comment) (Santyl, Cutimed Sorbact, ABD, Kerlix, tape) Wound Ankle Right;Posterior Date First Assessed: 11/29/18   POA: Yes  Wound Type: Vascular  Location: Ankle  Wound Description (Optional): Achilles  Orientation: Right;Posterior DRESSING STATUS Removed DRESSING TYPE Other (Comment) 
(Kerlix, ABD, gauze) Non-Pressure Injury Full thickness (subcut/muscle) Wound Length (cm) 5 cm Wound Width (cm) 5 cm Wound Depth (cm) 0.2 Wound Surface area (cm^2) 25 cm^2 Change in Wound Size % -39.51 Condition of Base Pink;Slough Condition of Edges Open Tissue Type Pink;Yellow Tissue Type Percent Pink 50 Tissue Type Percent Yellow 50 Drainage Amount  None Drainage Color Serous Wound Odor None Periwound Skin Condition Edematous Cleansing and Cleansing Agents  Normal saline Dressing Type Applied Other (Comment) (Santyl, Cutimed Sorbact, ABD, Kerlix, tape) Wound Calf Lower; Posterior;Right Date First Assessed/Time First Assessed: 11/08/18 2000   POA: Yes  Wound Type: Other  Location: Calf  Wound Description (Optional): scattered open wounds on right lower posterior leg & heel. Orientation: Lower; Posterior;Right DRESSING STATUS Removed DRESSING TYPE Other (Comment) 
(Kerlix, gauze, Santyl) Non-Pressure Injury Full thickness (subcut/muscle) Wound Length (cm) 18 cm Wound Width (cm) 16 cm Wound Depth (cm) 1.3 Wound Surface area (cm^2) 288 cm^2 Change in Wound Size % -6.57 Condition of Base Pink;Slough Condition of Edges Open Tissue Type Red;Yellow Tissue Type Percent Red 50 Tissue Type Percent Yellow 50 Drainage Amount  Large Drainage Color Serous Wound Odor None Periwound Skin Condition Edematous Cleansing and Cleansing Agents  Normal saline

## 2019-01-03 NOTE — PROGRESS NOTES
River Gonzalez is a 61 y.o.  female and presents with     Chief Complaint   Patient presents with    Diabetes     poc glucose- [facility] Follow Up     Pt missed several appointments. Pt saw cardology and had stress test done that was unremarkable. Pt was in hospital for leg wound infection. Pt is seeing Poditary. Home health sees the pt. The wound is not healing. She was taking levaquin but the wound infection shows Providentia that is resitant to Qionoloones. It also showed Enterococcus fecalis and MRSA. Pt also has DM that is uncontrolled. She feels weak and tired and wants PT/OT. She has appointment with vascular for PAD in am          Past Medical History:   Diagnosis Date    Anemia     Arthritis     both knee    Diabetes ([address]     Diet controlled    HLD (hyperlipidemia)     Hypertension     [facility]AD [facility]peripheral artery disease) ([facility])     s/p L TP trunk athrectomy (05/2013)    Stroke ([address]     2009     Past Surgical History:   Procedure Laterality Date    HX KNEE REPLACEMENT      left    HX TUMOR REMOVAL      soft palate    HX VASCULAR ACCESS      stent left leg 2011     Current Outpatient Medications   Medication Sig    vancomycin (VANCOCIN) 250 mg capsule Take 1 Cap by mouth four (4) times daily for 10 days.  glyBURIDE-metFORMIN (GLUCOVANCE) 2.5-500 mg per tablet Take 1 Tab by mouth Daily (before breakfast).  metroNIDAZOLE (FLAGYL) 500 mg tablet Take 1 Tab by mouth three (3) times daily for 10 days.  ibuprofen (MOTRIN) 800 mg tablet TAKE 1 TABLET BY MOUTH EVERY 8 HOURS AS NEEDED FOR PAIN    levoFLOXacin (LEVAQUIN) 500 mg tablet Take 1 Tab by mouth daily.  chlorthalidone (HYGROTEN) 25 mg tablet Take 1 Tab by mouth daily.  acetaminophen (TYLENOL) 325 mg tablet Take 500 mg by mouth every six (6) hours as needed for Pain.  furosemide (LASIX) 40 mg tablet Take 1 Tab by mouth two (2) times daily (with meals).     gabapentin (NEURONTIN) 100 mg capsule Take 1 Cap by mouth two (2) times a day.  potassium chloride (K-DUR, KLOR-CON) 20 mEq tablet Take 1 Tab by mouth daily.  levothyroxine (SYNTHROID) 25 mcg tablet Take 1 Tab by mouth Daily (before breakfast).  aspirin delayed-release 81 mg tablet Take 81 mg by mouth daily.  losartan (COZAAR) 50 mg tablet Take 1 Tab by mouth daily.  ondansetron (ZOFRAN ODT) 4 mg disintegrating tablet Take 1 Tab by mouth every eight (8) hours as needed for Nausea. No current facility-administered medications for this visit. Health Maintenance   Topic Date Due    FOOT EXAM Q1  11/24/1965    EYE EXAM RETINAL OR DILATED  11/24/1965    DTaP/Tdap/Td series (1 - Tdap) 11/24/1976    PAP AKA CERVICAL CYTOLOGY  11/24/1976    Shingrix Vaccine Age 50> (1 of 2) 11/24/2005    FOBT Q 1 YEAR AGE 50-75  11/24/2005    BREAST CANCER SCRN MAMMOGRAM  05/30/2014    LIPID PANEL Q1  07/18/2014    HEMOGLOBIN A1C Q6M  05/08/2019    MICROALBUMIN Q1  10/12/2019    Hepatitis C Screening  Completed    Pneumococcal 19-64 Medium Risk  Completed    Influenza Age 5 to Adult  Completed     Immunization History   Administered Date(s) Administered    Influenza Vaccine 01/23/2014    Influenza Vaccine (Quad) PF 11/05/2018    Pneumococcal Vaccine (Unspecified Type) 10/01/2012     No LMP recorded. Patient is postmenopausal.        Allergies and Intolerances: Allergies   Allergen Reactions    Codeine Hives    Lisinopril Swelling    Statins-Hmg-Coa Reductase Inhibitors Unknown (comments)    Sulfur Hives       Family History:   Family History   Problem Relation Age of Onset    Cancer Mother     Cancer Father     Diabetes Father     Hypertension Father        Social History:   She  reports that  has never smoked. she has never used smokeless tobacco.  She  reports that she does not drink alcohol.             Review of Systems:   General: negative for - chills, fatigue, fever, weight change  Psych: negative for - anxiety, depression, irritability or mood swings  ENT: negative for - headaches, hearing change, nasal congestion, oral lesions, sneezing or sore throat  Heme/ Lymph: negative for - bleeding problems, bruising, pallor or swollen lymph nodes  Endo: negative for - hot flashes, polydipsia/polyuria or temperature intolerance  Resp: negative for - cough, shortness of breath or wheezing  CV: negative for - chest pain, edema or palpitations  GI: negative for - abdominal pain, change in bowel habits, constipation, diarrhea or nausea/vomiting  : negative for - dysuria, hematuria, incontinence, pelvic pain or vulvar/vaginal symptoms  MSK: negative for - joint pain, joint swelling or muscle pain  Neuro: negative for - confusion, headaches, seizures or weakness  Derm: negative for - dry skin, hair changes, rash or skin lesion changes          Physical:   Vitals:   Vitals:    01/02/19 1555   BP: 131/61   Pulse: 82   Resp: 18   Temp: 97.7 °F (36.5 °C)   TempSrc: Oral   SpO2: 99%   Weight: 245 lb (111.1 kg)   Height: 5' 6\" (1.676 m)           Exam:   HEENT- atraumatic,normocephalic, awake, oriented, well nourished  Neck - supple,no enlarged lymph nodes, no JVD, no thyromegaly  Chest- CTA, no rhonchi, no crackles  Heart- rrr, no murmurs / gallop/rub  Abdomen- soft,BS+,NT, no hepatosplenomegaly  Ext - no c/c/edema , rt leg wound - deep multiple ulcer with unhealthy granulation tissue with yellowish discharge  Seen, bleeding , oozing some places. ,foul smell  Neuro- no focal deficits. Power 5/5 all extremities  Skin - warm,dry, no obvious rashes.           Review of Data:   LABS:   Lab Results   Component Value Date/Time    WBC 13.5 (H) 11/11/2018 04:20 AM    HGB 9.3 (L) 11/11/2018 04:20 AM    HCT 28.8 (L) 11/11/2018 04:20 AM    PLATELET 067 33/59/2645 04:20 AM     Lab Results   Component Value Date/Time    Sodium 138 11/09/2018 04:55 AM    Potassium 4.8 11/09/2018 04:55 AM    Chloride 107 11/09/2018 04:55 AM    CO2 22 11/09/2018 04:55 AM Glucose 117 (H) 11/09/2018 04:55 AM    BUN 12 11/09/2018 04:55 AM    Creatinine 1.09 11/09/2018 04:55 AM     Lab Results   Component Value Date/Time    Cholesterol, total 177 07/18/2013 08:40 AM    HDL Cholesterol 71 (H) 07/18/2013 08:40 AM    LDL, calculated 94 07/18/2013 08:40 AM    Triglyceride 62 07/18/2013 08:40 AM     No results found for: GPT        Impression / Plan:        ICD-10-CM ICD-9-CM    1. Wound infection T14. 8XXA 958.3 vancomycin (VANCOCIN) 250 mg capsule    L08.9  CULTURE, WOUND W GRAM STAIN      metroNIDAZOLE (FLAGYL) 500 mg tablet   2. DM type 2, goal HbA1c < 7% (HCC) E11.9 250.00 glyBURIDE-metFORMIN (GLUCOVANCE) 2.5-500 mg per tablet   3. Weakness R53.1 780.79 REFERRAL TO PHYSICAL THERAPY     Keep appt withj vascular,    ? Needs hyperbaric therapy. Explained to patient risk benefits of the medications. Advised patient to stop meds if having any side effects. Pt verbalized understanding of the instructions. I have discussed the diagnosis with the patient and the intended plan as seen in the above orders. The patient has received an after-visit summary and questions were answered concerning future plans. I have discussed medication side effects and warnings with the patient as well. I have reviewed the plan of care with the patient, accepted their input and they are in agreement with the treatment goals. Reviewed plan of care. Patient has provided input and agrees with goals. Follow-up Disposition:  Return in about 2 weeks (around 1/16/2019).     Johanny Francisco MD

## 2019-01-03 NOTE — PROGRESS NOTES
Progress and Debridement Note Patient: Mehnaz Soto MRN: [id number]  SSN: [id number] YOB: 1955  Age: 61 y.o. Sex: female Assessment:  
 
Active Problems: 
  PAD (peripheral artery disease) ([facility]) () Ulcer of lower extremity with fat layer exposed, right ([address] (10/11/2018) Cellulitis of leg (11/8/2018) Plan:  
  
Non selective debridement of the posterior aspect of the RLE/calf. Will continue [address] with Santyl ointment Q 24-48 hrs with cutimed sorbact. by [facility]. Continue antibiotic called in by PCP. She is to keep appointment with vascular surgery as scheduled for tomorrow. Subjective:  
 
60 y/o diabetic female presents for chronic ulcer RLL with as vascular component. She states she has been talking with her insurance company about [place] and they will only cover enough ointment for dressing changes Q 48 hrs Objective:  
 
Patient Vitals for the past 24 hrs: 
 Temp Pulse Resp BP SpO2  
01/03/19 1430 96.8 °F (36 °C) 81 16 119/66 95 % Physical Exam:  
 
General Exam 
alert, cooperative, no distress, appears stated age REVIEW OF SYSTEMS: 
General: denies chronic fatigue, weight loss, fever, anemia, bruising, depression, nervousness, panic attacks HEENT: denies ringing in ears, ear infections, dizzy spells, poor vision, glaucoma, sinus trouble, hoarseness, eye infections GI: denies diarrhea, gas, bloating, heartburn, regurgitation, difficulty swallowing, painful swallowing, nausea, vomiting, constipation, abdominal pain, decreased appetite, blood in stools, black stools, jaundice, dark urine Lungs: denies pneumonia, asthma, cough, SOB, hemoptysis Heart: denies chest pain, irregular heart beat, ankle swelling, HTN Skin: denies rashes, hives, allergic reaction Urinary: denies UTI, kidney stones, decreased urine force and flow, urination at night, blood in urine, painful urination Bones and Joints: denies arthritis, rheumatism, back pain, gout, osteoporosis Neurologic: denies stroke, seizures, headaches, numbness, tingling VASCULAR EXAM:. Pedal pulses are palpable 1/4 DP 1/4 PT right and left foot. Skin temperature is warm to warm right and left foot. Digital capillary fill time is 5 seconds right and left foot. There is edema of the right and left foot and ankle.  
  
NEUROLOGICAL EXAM:. Sensation Intact with 5.07g monofilament wire right and left foot. Deep tendon reflexes intac and symmetrical on the right and left foot. Babinski is age appropriate bilateral 
  MUSCULOSKELETAL EXAM:. Muscle tone is normal.  Muscle strength of the flexor and extensor group inversion and eversion Bilateral. 5/5.  
  
DERMATOLOGICAL EXAM:. Skin is of abnormal texture and turgor with atrophic skin changes noting hair growth, nail changes (thickening), Bilateral. There is a ulcer full thickness posterior, medial and lateral RLE and calf. There is evidence of slough and hypergranulation noted over the region post calf. New lesion noted over the posterior lateral right heel with pain upon palpation. Mild erythema noted, no asc lymphagitis   
  
 
 
 
Wound Calf Lower; Posterior;Right (Active) DRESSING STATUS Removed 12/20/2018  9:34 AM  
DRESSING TYPE Other (Comment) 12/20/2018  9:34 AM  
Non-Pressure Injury Full thickness (subcut/muscle) 12/20/2018  9:34 AM  
Wound Length (cm) 16 cm 12/20/2018  9:34 AM  
Wound Width (cm) 12.9 cm 12/20/2018  9:34 AM  
Wound Depth (cm) 1.4 12/20/2018  9:34 AM  
Wound Surface area (cm^2) 206.4 cm^2 12/20/2018  9:34 AM  
Change in Wound Size % 23.63 12/20/2018  9:34 AM  
Condition of [facility] 12/20/2018  9:34 AM  
Tissue Type Yellow;Black; Pink 12/20/2018  9:34 AM  
Tissue Type Percent Black 10 % 12/20/2018  9:34 AM  
Tissue Type Percent Pink 20 12/20/2018  9:34 AM  
Tissue Type Percent Yellow 70 12/20/2018  9:34 AM  
Drainage Amount  Large 12/20/2018  9:34 AM  
Drainage Color Green;Cloudy 12/20/2018  9:34 AM  
 Wound Odor None 12/20/2018  9:34 AM  
Periwound Skin Condition Edematous 12/20/2018  9:34 AM  
Cleansing and Cleansing Agents  Dermal wound cleanser 12/20/2018  9:34 AM  
Dressing Type Applied Other (Comment) 12/7/2018  9:21 AM  
Wound Procedure Type Remove [facility] 11/29/2018 10:19 AM  
Procedure Time Out 1145 11/29/2018 10:19 AM  
Consent Obtained  Yes 11/29/2018 10:19 AM  
Procedure Bleeding No 11/29/2018 10:19 AM  
Procedure Pain Scale Numeric 5/10 11/29/2018 10:19 AM  
Procedure Tolerated Well 11/29/2018 10:19 AM  
Number of days: 56 Wound Heel Right;Lateral (Active) DRESSING TYPE Open to air 12/20/2018  9:34 AM  
Non-Pressure Injury Full thickness (subcut/muscle) 12/20/2018  9:34 AM  
Wound Length (cm) 1.5 cm 12/20/2018  9:34 AM  
Wound Width (cm) 0.8 cm 12/20/2018  9:34 AM  
Wound Depth (cm) 0.4 12/20/2018  9:34 AM  
Wound Surface area (cm^2) 1.2 cm^2 12/20/2018  9:34 AM  
Condition of Base [facility] 12/20/2018  9:34 AM  
Tissue Type Yellow 12/20/2018  9:34 AM  
Tissue Type Percent Yellow 100 12/20/2018  9:34 AM  
Drainage Amount  Small  12/20/2018  9:34 AM  
Drainage Color Cloudy;Green 12/20/2018  9:34 AM  
Wound Odor None 12/20/2018  9:34 AM  
Periwound Skin Condition Edematous 12/20/2018  9:34 AM  
Cleansing and Cleansing Agents  Chlorhexidine gluconate 4% 12/20/2018  9:34 AM  
Dressing Type Applied Other (Comment) 12/20/2018  9:34 AM  
Number of days: 35 Wound Ankle Right;Posterior (Active) DRESSING STATUS Removed 12/20/2018  9:34 AM  
DRESSING TYPE Other (Comment) 12/20/2018  9:34 AM  
Non-Pressure Injury Full thickness (subcut/muscle) 12/20/2018  9:34 AM  
Wound Length (cm) 3.2 cm 12/20/2018  9:34 AM  
Wound Width (cm) 5.6 cm 12/20/2018  9:34 AM  
Wound Depth (cm) 0.2 12/20/2018  9:34 AM  
Wound Surface area (cm^2) 17.92 cm^2 12/20/2018  9:34 AM  
Condition of Base Slough;Oolitic 12/20/2018  9:34 AM  
Condition of Edges Open 12/20/2018  9:34 AM  
Tissue Type Yellow;Pink 12/20/2018  9:34 AM  
 Tissue Type Percent Pink 40 12/20/2018  9:34 AM  
Tissue Type Percent Yellow 60 12/20/2018  9:34 AM  
Drainage Amount  Moderate 12/20/2018  9:34 AM  
Drainage Color Serosanguinous 12/20/2018  9:34 AM  
Wound Odor None 12/20/2018  9:34 AM  
Periwound Skin Condition Edematous 12/20/2018  9:34 AM  
Cleansing and Cleansing Agents  Dermal wound cleanser 12/20/2018  9:34 AM  
Dressing Type Applied Other (Comment) 12/20/2018  9:34 AM  
Wound Procedure Type Selective Debridement 12/20/2018  9:34 AM  
Procedure Time Out 0930 12/20/2018  9:34 AM  
Consent Obtained  Yes 12/20/2018  9:34 AM  
Procedure Pain Scale Numeric 8/10 12/20/2018  9:34 AM  
Post Procedure Procedure Pain Scale Numeric 8/10 12/20/2018  9:34 AM  
Procedure Tolerated Fair 12/20/2018  9:34 AM  
Number of days: 35 Wound Leg Lateral;Left (Removed) DRESSING TYPE Open to air 9/23/2013 11:28 AM  
Non-Pressure Injury Full thickness (subcut/muscle) 6/24/2013  2:57 PM  
Condition of Base Eschar 6/24/2013  2:57 PM  
Condition of Edges Open 6/24/2013  2:57 PM  
Epithelialization (%) 100 9/23/2013 11:28 AM  
Tissue Type Yellow 6/24/2013  2:57 PM  
Drainage Amount  Small  6/24/2013  2:57 PM  
Drainage Color Serous 6/24/2013  2:57 PM  
Wound Odor None 6/24/2013  2:57 PM  
Cleansing and Cleansing Agents  Dermal wound cleanser 6/24/2013  2:57 PM  
Dressing Type Applied Gauze;Gauze wrap (bella); Alginate 6/24/2013  3:30 PM  
Procedure Tolerated Well 6/24/2013  3:30 PM  
Number of days: 91 Wound Heel Left (Removed) DRESSING STATUS Removed 8/5/2013 11:13 AM  
DRESSING TYPE Gauze wrap (bella);Gauze; Compression dressing 8/5/2013 11:13 AM  
Non-Pressure Injury Partial thickness (epider/derm) 8/5/2013 11:13 AM  
Condition of Base Pink;Slough 8/5/2013 11:13 AM  
Condition of Edges Closed 8/5/2013 11:13 AM  
Epithelialization (%) 0 6/24/2013  2:57 PM  
Tissue Type Yellow 6/24/2013  2:57 PM  
Tissue Type Percent Pink 50 7/22/2013 11:06 AM  
 Tissue Type Percent Yellow 99 8/5/2013 11:13 AM  
Drainage Amount  None 8/5/2013 11:13 AM  
Drainage Color Serosanguinous 7/22/2013 11:06 AM  
Wound Odor None 8/5/2013 11:13 AM  
Periwound Skin Condition Intact 8/5/2013 11:13 AM  
Cleansing and Cleansing Agents  Dermal wound cleanser 8/5/2013 11:13 AM  
Dressing Type Applied Gauze;Gauze wrap (bella) 8/5/2013 11:51 AM  
Procedure Tolerated Well 8/5/2013 11:51 AM  
Number of days: 91 Wound Leg Left (Removed) DRESSING STATUS Removed 9/9/2013  2:54 PM  
DRESSING TYPE Open to air 9/23/2013 11:22 AM  
Non-Pressure Injury Partial thickness (epider/derm) 9/9/2013  2:54 PM  
Condition of Base Epithelializing 9/9/2013  2:54 PM  
Condition of Edges Open 9/9/2013  2:54 PM  
Epithelialization (%) 100 9/23/2013 11:22 AM  
Tissue Type Pink;Yellow 8/19/2013 11:34 AM  
Tissue Type Percent Pink 40 9/9/2013  2:54 PM  
Tissue Type Percent Red 40 8/5/2013 11:13 AM  
Tissue Type Percent Yellow 15 8/19/2013 11:34 AM  
Drainage Amount  None 9/23/2013 11:22 AM  
Drainage Color Serous 9/9/2013  2:54 PM  
Wound Odor None 9/23/2013 11:22 AM  
Periwound Skin Condition Intact 9/23/2013 11:22 AM  
Cleansing and Cleansing Agents  Dermal wound cleanser 9/9/2013  2:54 PM  
Dressing Type Applied Open to air 9/23/2013 11:22 AM  
Procedure Tolerated Well 9/23/2013 11:22 AM  
Number of days: 89 Lab/Data Review: No results found for this or any previous visit (from the past 24 hour(s)). Date: 12/20/2018 Department: [facility] Released By: Adeline Markham RN (auto-released) Authorizing: Sree Reed DPM  
Specimen Information: Wound Drainage  
    
Component Value Flag Ref Range Units Status Special Requests:      Final  
NO SPECIAL REQUESTS   
GRAM STAIN FEW WBC'S      Final  
GRAM STAIN      Final  
MANY GRAM POSITIVE COCCI IN PAIRS   
GRAM STAIN      Final  
FEW GRAM POSITIVE COCCI IN CHAINS   
GRAM STAIN      Final  
MANY GRAM POSITIVE COCCI IN GROUPS GRAM STAIN      Final  
FEW GRAM POSITIVE RODS Culture result: (Abnormal)      Final  
MODERATE **METHICILLIN RESISTANT STAPHYLOCOCCUS AUREUS** Abnormal    
Culture result: (Abnormal)      Final  
FEW PROVIDENCIA STUARTII Abnormal    
Culture result: FEW DIPHTHEROIDS Abnormal   A     Final  
Culture result: (Abnormal)      Final  
ENTEROCOCCUS FAECALIS GROUP D ISOLATED FROM BROTH ONLY Abnormal    
Susceptibility Staphylococcus aureus Methcillin Resistant Antibiotic Interpretation Value Method Comment Penicillin G ($$) Resistant >=0.5 ug/mL GLENDA Clindamycin ($) Resistant >=8 ug/mL GLENDA Erythromycin ($$$$) Resistant >=8 ug/mL GLENDA Gentamicin ($) Susceptible <=0.5 ug/mL GLENDA Levofloxacin ($) Resistant >=8 ug/mL GLENDA Oxacillin Resistant >=4 ug/mL GLENDA Rifampin ($$$$) Susceptible <=0.5 ug/mL GLENDA Tetracycline Resistant >=16 ug/mL GLENDA Vancomycin ($) Susceptible 1 ug/mL GLENDA Trimeth-Sulfamethoxa Susceptible <=10 ug/mL GLENDA Tigecycline ($$$$) Susceptible <=0.12 ug/mL GLENDA Providencia stuartii Antibiotic Interpretation Value Method Comment Ampicillin ($) Resistant 16 ug/mL GLENDA Ampicillin/sulbactam ($) Intermediate 16 ug/mL GLENDA Cefazolin ($) Resistant >=64 ug/mL GLENDA Cefepime ($$) Susceptible <=1 ug/mL GLENDA Ceftazidime ($) Susceptible <=1 ug/mL GLENDA Ceftriaxone ($) Susceptible <=1 ug/mL GLENDA Ciprofloxacin ($) Intermediate 2 ug/mL GLENDA Gentamicin ($) Resistant <=1 ug/mL GLENDA Imipenem Susceptible 2 ug/mL GLENDA Levofloxacin ($) Resistant >=8 ug/mL GLENDA Piperacillin/Tazobac ($) Susceptible <=4 ug/mL GLENDA Tobramycin ($) Resistant 2 ug/mL GLENDA Trimeth-Sulfamethoxa Susceptible <=20 ug/mL GLENDA Enterococcus  faecalis group D Antibiotic Interpretation Value Method Comment Ampicillin ($) Susceptible <=2 ug/mL GLENDA Penicillin G ($$) Susceptible 2 ug/mL GLENDA Vancomycin ($) Susceptible 1 ug/mL GLENDA Tigecycline ($$$$) Susceptible <=0.12 ug/mL GLENDA   
 Condensed View Susceptibility Staphylococcus aureus Methcillin Resistant (1) Antibiotic Interpretation Method Status Ampicillin/sulbactam ($) Resistant GLENDA Final   
Penicillin G ($$) Resistant GLENDA Final   
Cefazolin ($) Resistant GLENDA Final   
Clindamycin ($) Resistant GLENDA Final   
Erythromycin ($$$$) Resistant GLENDA Final   
Gentamicin ($) Susceptible GLENDA Final   
Levofloxacin ($) Resistant GLENDA Final   
Oxacillin Resistant GLENDA Final   
Rifampin ($$$$) Susceptible GLENDA Final   
Tetracycline Resistant GLENDA Final   
Vancomycin ($) Susceptible GLENDA Final   
Trimeth-Sulfamethoxa Susceptible GLENDA Final   
Tigecycline ($$$$) Susceptible GLENDA Final   
Providencia stuartii (2) Antibiotic Interpretation Method Status Ampicillin ($) Resistant GLENDA Final   
Ampicillin/sulbactam ($) Intermediate GLENDA Final   
Cefazolin ($) Resistant GLENDA Final   
Cefepime ($$) Susceptible GLENDA Final   
Ceftazidime ($) Susceptible GLENDA Final   
Ceftriaxone ($) Susceptible GLENDA Final   
Ciprofloxacin ($) Intermediate GLENDA Final   
Gentamicin ($) Resistant GLENDA Final   
Imipenem Susceptible GLENDA Final   
Levofloxacin ($) Resistant GLENDA Final   
Piperacillin/Tazobac ($) Susceptible GLENDA Final   
Tobramycin ($) Resistant GLENDA Final   
Trimeth-Sulfamethoxa Susceptible GLENDA Final   
Enterococcus  faecalis group D (4) Antibiotic Interpretation Method Status Ampicillin ($) Susceptible GLENDA Final   
Penicillin G ($$) Susceptible GLENDA Final   
Gentamicin Synergy S Susceptible GLENDA Final   
Streptomycin Synergy Susceptible GLENDA Final   
Vancomycin ($) Susceptible GELNDA Final   
Tigecycline ($$$$) Susceptible GLENDA Final   
 Condensed View Lab and Collection CULTURE, Debo Alegre (Order: [id number]) - 12/20/2018 PROCEDURE Selective sharp instrument excisional debridement of slough and devitilized tissue After the benefits/risks/SE were discussed, the patient agreed to proceed. Time out was done: * Patient was identified by name and date of birth * Agreement on procedure being performed was verified * Procedure site verified and marked as necessary * Patient was positioned for comfort * Consent was signed and verified. Site: RLE Instruments used:  
 []  Dermal curette  [x] Blade [x] #15  [] #10  [] Forceps  [] Tissue nippers  [] sterile scissors  [] Other Anesthesia:  
 [x]  EMLA 2.5% cream: applied to wound beds for approximately 15minutes. []   Lidocaine 2% Topical Gel    
 []  Lidocaine injectable 1% with epinephrine 1:100,000 []  Lidocaine injectable 1% without epinephrine  
 []  Other:   
 []  None  
      [] patient is insensate due to neuropathy  
      [] patient declines 
      [] allergy to anesthetic 
      [] tissue for debridement is either superficial, loosely adherent and/or necrotic and denervated After satisfactory anesthesia achieved, the wound/s was/were Non selective debridement was performed entire posterior, medial and lateral aspect of the RLE Bleeding: <5mL Resolved with light focal pressure. Wounds were cleaned and irrigated with saline. Wound care applied: 
 []   Hydrogell   []  Hypergel   []   Hydrofiber/Aquacel    
 []   Cadexomer Iodine (Iodosorb)   []  Silver Alginate    []   Medihoney:  
 [x]   Collagenase:Santyl   []  Calcium Alginate   []   Collagen:  
 []   Foam   []  Non-Adherent Contact Layer   []   Xeroform  
 []   Adaptec:   []   Hydrocolloid   []   Transparent Film  
 []  Antibiotic ointment/cream     []   Other (see below) Other:cutimed sorbact [x]   Dry Gauze and Roll Gauze  
 []   Foam and Roll Gauze  
 []   Dry Gauze  
 []   Bordered gauze:   
 []   Secure with Tape  
 []   Bordered foam  
  
 []   Other    
 []   Compression Wrap: 
        []   [facility]    []Multi-Layer    []Tubular Bandages [facility] 
  []   Cream  
  []   Lotion []   Ointment  
  []   Barrier  
  []   Other: The patient tolerated the procedure well with no complications. The patient left the exam room in satisfactory and stable condition.   
 
Signed By: Loyd Haas DPM   
 January 3, 2019 3:45 PM

## 2019-01-03 NOTE — TELEPHONE ENCOUNTER
Patient called in and was wanting to know if Dr. Low Merchant or a nurse could call her physical therapist and have them come out to the house once a week for a few weeks so she can go to her other physical therapist. She wasn't sure if this could be done by the doctor or not but she wanted to ask and asked that I put a message back to the doctor. Please advise.

## 2019-01-04 ENCOUNTER — HOME CARE VISIT (OUTPATIENT)
Dept: HOME HEALTH SERVICES | Facility: HOME HEALTH | Age: 64
End: 2019-01-04
Payer: COMMERCIAL

## 2019-01-04 ENCOUNTER — HOME CARE VISIT (OUTPATIENT)
Dept: SCHEDULING | Facility: HOME HEALTH | Age: 64
End: 2019-01-04
Payer: COMMERCIAL

## 2019-01-04 VITALS
OXYGEN SATURATION: 95 % | HEART RATE: 81 BPM | SYSTOLIC BLOOD PRESSURE: 118 MMHG | DIASTOLIC BLOOD PRESSURE: 73 MMHG | TEMPERATURE: 98.8 F | RESPIRATION RATE: 16 BRPM

## 2019-01-05 LAB — RESULT: ABNORMAL

## 2019-01-06 ENCOUNTER — TELEPHONE (OUTPATIENT)
Dept: FAMILY MEDICINE CLINIC | Age: 64
End: 2019-01-06

## 2019-01-06 DIAGNOSIS — A49.02 INFECTION OF WOUND DUE TO METHICILLIN RESISTANT STAPHYLOCOCCUS AUREUS (MRSA): Primary | ICD-10-CM

## 2019-01-06 RX ORDER — LINEZOLID 600 MG/1
600 TABLET, FILM COATED ORAL 2 TIMES DAILY
Qty: 20 TAB | Refills: 0 | Status: SHIPPED | OUTPATIENT
Start: 2019-01-06 | End: 2019-02-22

## 2019-01-06 RX ORDER — GABAPENTIN 100 MG/1
CAPSULE ORAL
Qty: 60 CAP | Refills: 1 | Status: SHIPPED | OUTPATIENT
Start: 2019-01-06 | End: 2019-03-29 | Stop reason: SDUPTHER

## 2019-01-07 ENCOUNTER — HOME CARE VISIT (OUTPATIENT)
Dept: HOME HEALTH SERVICES | Facility: HOME HEALTH | Age: 64
End: 2019-01-07
Payer: COMMERCIAL

## 2019-01-07 ENCOUNTER — TELEPHONE (OUTPATIENT)
Dept: FAMILY MEDICINE CLINIC | Age: 64
End: 2019-01-07

## 2019-01-07 NOTE — TELEPHONE ENCOUNTER
Pt called back, glucose 164. Pt wants to know should she continue to take glyBURIDE-metFORMIN (GLUCOVANCE) 2.5-500 mg per tablet. She states since she started taking it on Thursday, she has had problems every since. Please advise.

## 2019-01-07 NOTE — TELEPHONE ENCOUNTER
Wound cult shows MRSA., Will ask pt to take linezolid twice daily for 10 days. Pt can stop vancomycin.

## 2019-01-07 NOTE — TELEPHONE ENCOUNTER
Patient called in and stated her sugar is between 45 and 50. Nurse review patients meals, and she ate a banana and will be eating a yogurt. Nurse advised patient to make a peanut butter and jelly sandwich and drink a soda to elevate sugar, pt will call nurse back in 2 hours with reading.

## 2019-01-08 ENCOUNTER — HOME CARE VISIT (OUTPATIENT)
Dept: SCHEDULING | Facility: HOME HEALTH | Age: 64
End: 2019-01-08
Payer: COMMERCIAL

## 2019-01-08 VITALS
DIASTOLIC BLOOD PRESSURE: 90 MMHG | TEMPERATURE: 97.7 F | OXYGEN SATURATION: 96 % | SYSTOLIC BLOOD PRESSURE: 165 MMHG | HEART RATE: 90 BPM

## 2019-01-08 DIAGNOSIS — E11.21 TYPE 2 DIABETES WITH NEPHROPATHY (HCC): Primary | ICD-10-CM

## 2019-01-08 PROCEDURE — G0299 HHS/HOSPICE OF RN EA 15 MIN: HCPCS

## 2019-01-08 PROCEDURE — G0151 HHCP-SERV OF PT,EA 15 MIN: HCPCS

## 2019-01-08 RX ORDER — METFORMIN HYDROCHLORIDE 500 MG/1
500 TABLET ORAL 2 TIMES DAILY WITH MEALS
Qty: 180 TAB | Refills: 4 | Status: SHIPPED | OUTPATIENT
Start: 2019-01-08 | End: 2019-05-22

## 2019-01-08 NOTE — TELEPHONE ENCOUNTER
Patient is calling back and stated that she now have a low blood sugar of 43. Patient denied having any difficulty breathing or any types of headache. Asking to be called back.

## 2019-01-08 NOTE — TELEPHONE ENCOUNTER
Please rx metformin. Nurse spoke to patient and advised to stop taking glyBURIDE-metFORMIN (GLUCOVANCE) 2.5-500 mg per tablet. Pt verbalized understanding.

## 2019-01-09 ENCOUNTER — HOME CARE VISIT (OUTPATIENT)
Dept: HOME HEALTH SERVICES | Facility: HOME HEALTH | Age: 64
End: 2019-01-09
Payer: COMMERCIAL

## 2019-01-09 ENCOUNTER — HOME CARE VISIT (OUTPATIENT)
Dept: SCHEDULING | Facility: HOME HEALTH | Age: 64
End: 2019-01-09
Payer: COMMERCIAL

## 2019-01-09 VITALS — OXYGEN SATURATION: 96 % | HEART RATE: 82 BPM

## 2019-01-09 PROCEDURE — G0157 HHC PT ASSISTANT EA 15: HCPCS

## 2019-01-09 PROCEDURE — G0299 HHS/HOSPICE OF RN EA 15 MIN: HCPCS

## 2019-01-10 ENCOUNTER — TELEPHONE (OUTPATIENT)
Dept: FAMILY MEDICINE CLINIC | Age: 64
End: 2019-01-10

## 2019-01-10 VITALS
SYSTOLIC BLOOD PRESSURE: 148 MMHG | OXYGEN SATURATION: 98 % | DIASTOLIC BLOOD PRESSURE: 82 MMHG | TEMPERATURE: 98.2 F | HEART RATE: 86 BPM | RESPIRATION RATE: 16 BRPM

## 2019-01-10 VITALS
DIASTOLIC BLOOD PRESSURE: 82 MMHG | HEART RATE: 86 BPM | OXYGEN SATURATION: 98 % | SYSTOLIC BLOOD PRESSURE: 148 MMHG | TEMPERATURE: 98.2 F

## 2019-01-11 ENCOUNTER — HOME CARE VISIT (OUTPATIENT)
Dept: SCHEDULING | Facility: HOME HEALTH | Age: 64
End: 2019-01-11
Payer: COMMERCIAL

## 2019-01-11 PROCEDURE — G0299 HHS/HOSPICE OF RN EA 15 MIN: HCPCS

## 2019-01-11 NOTE — TELEPHONE ENCOUNTER
Nurse called patient, two patient identifiers used and confirmed by patient. Advised patient to continue both, only medication she was taking off of is doxycycline. Pt verbalized understanding.

## 2019-01-13 VITALS
DIASTOLIC BLOOD PRESSURE: 67 MMHG | RESPIRATION RATE: 18 BRPM | SYSTOLIC BLOOD PRESSURE: 116 MMHG | TEMPERATURE: 97.7 F | HEART RATE: 90 BPM | OXYGEN SATURATION: 98 %

## 2019-01-14 ENCOUNTER — HOME CARE VISIT (OUTPATIENT)
Dept: SCHEDULING | Facility: HOME HEALTH | Age: 64
End: 2019-01-14
Payer: COMMERCIAL

## 2019-01-14 PROCEDURE — G0157 HHC PT ASSISTANT EA 15: HCPCS

## 2019-01-15 ENCOUNTER — HOME CARE VISIT (OUTPATIENT)
Dept: HOME HEALTH SERVICES | Facility: HOME HEALTH | Age: 64
End: 2019-01-15
Payer: COMMERCIAL

## 2019-01-15 ENCOUNTER — HOME CARE VISIT (OUTPATIENT)
Dept: SCHEDULING | Facility: HOME HEALTH | Age: 64
End: 2019-01-15
Payer: COMMERCIAL

## 2019-01-15 VITALS
SYSTOLIC BLOOD PRESSURE: 115 MMHG | OXYGEN SATURATION: 97 % | TEMPERATURE: 97.2 F | DIASTOLIC BLOOD PRESSURE: 62 MMHG | HEART RATE: 87 BPM

## 2019-01-15 PROCEDURE — G0299 HHS/HOSPICE OF RN EA 15 MIN: HCPCS

## 2019-01-16 VITALS
SYSTOLIC BLOOD PRESSURE: 120 MMHG | DIASTOLIC BLOOD PRESSURE: 64 MMHG | TEMPERATURE: 98.6 F | HEART RATE: 90 BPM | RESPIRATION RATE: 18 BRPM | OXYGEN SATURATION: 96 %

## 2019-01-17 ENCOUNTER — HOME CARE VISIT (OUTPATIENT)
Dept: HOME HEALTH SERVICES | Facility: HOME HEALTH | Age: 64
End: 2019-01-17
Payer: COMMERCIAL

## 2019-01-18 ENCOUNTER — HOME CARE VISIT (OUTPATIENT)
Dept: HOME HEALTH SERVICES | Facility: HOME HEALTH | Age: 64
End: 2019-01-18
Payer: COMMERCIAL

## 2019-03-29 RX ORDER — GABAPENTIN 100 MG/1
CAPSULE ORAL
Qty: 60 CAP | Refills: 1 | Status: SHIPPED | OUTPATIENT
Start: 2019-03-29

## 2019-06-03 RX ORDER — GABAPENTIN 100 MG/1
CAPSULE ORAL
Qty: 60 CAP | Refills: 1 | OUTPATIENT
Start: 2019-06-03

## 2024-12-01 NOTE — WOUND CARE
Goal Outcome Evaluation:      VS stable, good pain management. [place] patent with adequate output. Bilateral SCDs maintained. Saline lock in place. Proper use of Incentive Spirometer noted. Good infant maternal interaction noted.                                        Patient Name: Chris Gomez : 1955 MRN: [id number] Situation: Wound care consulted for Right Lower Extremity wound Background: Right Lower extremity wounds that have worsened over the past 3 days. Patient confirms that she saw her PCP 3 days ago. States she was initially on Doxycycline and her PCP switched her to Augmentin. However, the patient states the surrounding cellulitis has worsened and the wounds have worsened and are consistently draining. Assessment: 
Mepilex removed, serosanguinous drainage observed. Wound and jesus wound cleansed with Dermal wound cleanser. [place]ough predominately however, some pink tissue present as well. Wound measures 23.5x11.5x0.5. Patient reports leg is more red and swollen than her baseline. No pain with wound care. Plan: Wound dressed with Aquacel AG and Kerlix. Patient awaiting vascular studies. Wound care recommends Iodsorb dressing. Follow up for wound care in wound clinic scheduled for 2018.